# Patient Record
Sex: FEMALE | Race: WHITE | Employment: FULL TIME | URBAN - METROPOLITAN AREA
[De-identification: names, ages, dates, MRNs, and addresses within clinical notes are randomized per-mention and may not be internally consistent; named-entity substitution may affect disease eponyms.]

---

## 2018-08-08 ENCOUNTER — HOSPITAL ENCOUNTER (OUTPATIENT)
Dept: PHYSICAL THERAPY | Age: 23
Discharge: HOME | End: 2018-08-08
Payer: OTHER GOVERNMENT

## 2018-08-08 PROCEDURE — 97162 PT EVAL MOD COMPLEX 30 MIN: CPT

## 2018-08-08 PROCEDURE — 97530 THERAPEUTIC ACTIVITIES: CPT

## 2018-08-08 NOTE — PROGRESS NOTES
[facility]  [address] [facility]  [phone] fax    Plan of Care/ Statement of Necessity for Physical Therapy Services    Patient name: [facility] Start of Care: 2018   Referral source: Britany Jennings MD : 1995    Medical Diagnosis: Pelvic and perineal pain [R10.2]   Onset Date: chronic, about 10 years    Treatment Diagnosis: PFD, stress UI   Prior Hospitalization: see medical history Provider#: [id number]   Medications: Verified on Patient summary List    Comorbidities: , l/s compression fracture, Left hip avulsion fracture, depression, anxiety, PTSD, bordeline personality disorder, asthma   Prior Level of Function: less pain with all mobility, less leakage episodes         The Plan of Care and following information is based on the information from the initial evaluation. Assessment/ key information: Pt is a 21 y/o F who presents with c/o lower abdominal region and pelvic. Pt also reports ovary cysts, persistent menstruation for 7 weeks; pt will follow with specialist considering this. Pain/problem started since 14years old. Pt also had chronic back pain problem due to compression fracture since 15years old. Pt c/o stress UI symptoms since first child birth ( after attempt natural labor for 20 hours 2016). Pt can't recall number of leakage episode but reports increasing frequency. Nocturia 2x/night but mostly due to waking up to change her menstrual pads. Also, has bowel dysfunction including constipation and depends on stool softener; pt also has nausea; 1 bowel movement every couple days. Pt notices straining to have successful bowel movements. Pt reports max with internal exam/pap smear test; pt stated that she .  Pt reports pain which occur during sexual relations. Pain is rated 3/3 on Marinoff scale.  Evaluation reveals patient with musculoskeletal screen WNL with AROM of trunk, poor core and hips strength/endurance, TTP at lower abdominal, mod tightness of paraspinal muscles, slouching posture.  Internal assessment held due to active menses. Patient may benefit from physical therapy to address PFD, constipation and stress UI to improve QOL. Evaluation Complexity History HIGH Complexity :3+ comorbidities / personal factors will impact the outcome/ POC ; Examination MEDIUM Complexity : 3 Standardized tests and measures addressing body structure, function, activity limitation and / or participation in recreation  ;Presentation MEDIUM Complexity : Evolving with changing characteristics  ; Clinical Decision Making MEDIUM Complexity : FOTO score of 26-74  Overall Complexity Rating: MEDIUM    Problem List: Pelvic pain/dysfunction, Decreased pelvic floor mm awareness, Decreased pelvic floor mm strength, Use of accessory muscles, Improper voiding habits and Hypertonus of pelvic floor    Treatment Plan may include any combination of the following:   Therapeutic exercise, Neuromuscular re-education, Manual therapy, Physical agent/modality and Patient education  Patient / Family readiness to learn indicated by: asking questions, trying to perform skills and interest    Persons(s) to be included in education: patient (P)    Barriers to Learning/Limitations: yes;  emotional    Patient Goal (s): possible sex with no pain    Patient Self Reported Health Status: fair    Rehabilitation Potential: fair    Short Term Goals: To be accomplished in 4  weeks:  1. Patient will demonstrate home exercise program accurately as adjunct to PT clinic visits to promote healthy lifestyle and increase quality of life. Status @ Eval: initiated with abdominal massage    2. Patient will report ability to utilize Dilator progressively with no pain to promote decrease of symptoms and increase quality of life. Status @ Eval: provide as indicated      3.  Patient will report pain decreased to 2-3/3 on Marinoff scale for more normal function and increased quality of life. Status @ Eval: 3/3     4. Patient will have decreased pain to 5-6/10 at most with sexual relations and gynecological exams. Status @ Eval: 10/10 at worst     Long Term Goals: To be accomplished in 8  weeks:  1. Patient will have FOTO score for PFDI Pain decreased by 5-8% points indicating improvement in function and report of no difficulty with maintaining intimate relations due to pain. Status @ Eval: 79    2. Patient will be able to participate in sexual relations with no discomfort with 0-1/3 Marinoff score for increased quality of life and more normal function. Status @ Eval: 3/3     3. Patient will have decreased pain to 3/10 at most with sexual relations and gynecological exams. Status @ Eval: 10/10 at worst    4. Patient will report at least 50% improvement with stress UI to improve her QOL. Status @ Eval: couple leakage episodes within a month. 5. Patient will be independent with HEP at time of discharge to be able to continue with pelvic floor program.  Status @ Eval: initiated with abdominal massage     Frequency / Duration: Patient to be seen 2 times per week for 8 weeks. Patient/ Caregiver education and instruction: Diagnosis, prognosis, Proper Voiding Habits, Diet, Pain Management, Exercises and Bladder Retraining   [x]  Plan of care has been reviewed with DENITA Borjas, PT 8/8/2018 10:24 AM    ________________________________________________________________________    I certify that the above Therapy Services are being furnished while the patient is under my care. I agree with the treatment plan and certify that this therapy is necessary.     Physician's Signature:____________Date:_________TIME:________      ** Signature, Date and Time must be completed for valid certification **    Please sign and return to [facility] - [facility] [employer] Blas DIEGO  [address] [place] [address]  [phone] fax

## 2018-08-08 NOTE — PROGRESS NOTES
PF EVALUATION/TREATMENT NOTE     Patient Name: Lei Loomis  Date:2018  : 1995  [x]  Patient  Verified  Payor:  / Plan: Clayton Marie 73 / Product Type:  /    In time:9:45  Out time:10:20  Total Treatment Time (min): 35  Total Timed Codes (min): 20  1:1 Treatment Time ( [address] only): 35   Visit #: 1  16    Treatment Area: [x] Pelvic Floor     [] Other:    SUBJECTIVE  Any medication changes, allergies to medications, adverse drug reactions, diagnosis change, or new procedure performed?: [x] No    [] Yes (see summary sheet for update)    Pain Level : At Present: 7-8/10/10, At Best 8/10, At Worst 10/10   [] Constant []Intermittent    []Improving  []Staying the Same  []Getting worse    Current symptoms/Complaints: Pt is a 19 y/o F who presents with c/o lower abdominal region and pelvic. Pt also reports ovary cysts, persistent menstruation for 7 weeks; pt will follow with specialist considering this. Pain/problem started since 14years old. Pt also had chronic back pain problem due to compression fracture since 15years old. Pt c/o stress UI symptoms since first child birth ( after attempt natural labor for 20 hours ). Pt can't recall number of leakage episode but reports increasing frequency. Nocturia 2x/night but mostly due to waking up to change her menstrual pads. Also, has bowel dysfunction including constipation and depends on stool softener; pt also has nausea; 1 bowel movement every couple days. Pt notices straining to have successful bowel movements. Pt reports max with internal exam/pap smear test; pt stated that she . Pt reports pain which occur during sexual relations. Pain is rated 3/3 on Marinoff scale.       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  Living Situation:   Domestic Life: Activity/Recreational Limitations:     Pt Goals: \"possible sex with no pain\"  Barriers: comorbidities  []N/A []pain []financial []time []transportation []other  Motivation: fair  Substance use:  []N/A  []Alcohol []Tobacco []other:   FABQ Score: []low []elevate   Cognition: A & O x     Other:      OBJECTIVE     Pelvic Floor Dysfunction Evaluation    Musculoskeletal Screen:    Skin Integrity:  [] Healthy [] Red  [] Labia Atrophy [] Fragile    Sensation: [] Intact [] Diminished:    Muscle Bulk: [] Symmetrical  [] Well-developed [] Atrophied:  []L   []R   []B    Prolapse: [] Cystocele:   [] Rectocele:    PERF Score (Performance/Endurance/Repetitions/Flicks)   P:  E: R:  F:  Total:    Accessory Muscle Use:     Patient has failed previous pelvic floor muscle training? [] Yes    [] No    Lumbar Screen: WFL  ROM deficits:  Strength deficits: poor core and hips strength, motivation & fatigue issue? Breath Patterns:    Daily Water Intake: Bowel Habits:     15 min []Eval                  []Re-Eval        20 min Therapeutic Activity:  []  See flow sheet :    []  Increase Tissue extensibility        []  Assess fiber intake    []  Assess voiding habits  []  Assess bowel habits  [x]  Other: Pt edu within scope of practice on prognosis, POC, PFM anatomy/physiology, modalities use, modalities use, TENs unit use, abdominal massage, relaxation, meditation, sleep and overall health, hormonal balance. Rationale: increase ROM, increase strength, improve coordination, improve balance and increase proprioception  to improve the patients ability to tolerate ADLs, improve defecation dynamics. .          With   [] TE   [] TA   [] neuro  [] manual   [] other: Patient Education: [x] Review HEP    [] Progressed/Changed HEP based on:   [] positioning   [] body mechanics   [] transfers   [] heat/ice application    [] other:      Other Objective/Functional Measures:     Pain Level (0-10 scale) post treatment: 8/10    ASSESSMENT/Changes in Function: see POC    Patient will continue to benefit from skilled PT services to modify and progress therapeutic interventions, address functional mobility deficits, address ROM deficits, address strength deficits, analyze and address soft tissue restrictions, analyze and cue movement patterns, analyze and modify body mechanics/ergonomics, assess and modify postural abnormalities, address imbalance/dizziness and instruct in home and community integration to attain remaining goals.      [x]  See Plan of Care  []  See progress note/recertification  []  See Discharge Summary         Progress towards goals / Updated goals:  See POC    PLAN  [x]  Upgrade activities as tolerated     [x]  Continue plan of care  []  Update interventions per flow sheet       []  Discharge due to:_  []  Other:_    Ward Mckinney, PT 8/8/2018  9:47 AM

## 2018-08-22 ENCOUNTER — APPOINTMENT (OUTPATIENT)
Dept: PHYSICAL THERAPY | Age: 23
End: 2018-08-22
Payer: OTHER GOVERNMENT

## 2018-08-28 ENCOUNTER — HOSPITAL ENCOUNTER (OUTPATIENT)
Dept: LAB | Age: 23
Discharge: HOME | End: 2018-08-28
Payer: OTHER GOVERNMENT

## 2018-08-28 ENCOUNTER — OFFICE VISIT (OUTPATIENT)
Dept: ONCOLOGY | Age: 23
End: 2018-08-28

## 2018-08-28 ENCOUNTER — HOSPITAL ENCOUNTER (OUTPATIENT)
Dept: ONCOLOGY | Age: 23
Discharge: HOME | End: 2018-08-28

## 2018-08-28 VITALS
TEMPERATURE: 99 F | HEIGHT: 65 IN | SYSTOLIC BLOOD PRESSURE: 110 MMHG | DIASTOLIC BLOOD PRESSURE: 70 MMHG | RESPIRATION RATE: 18 BRPM | BODY MASS INDEX: 24.89 KG/M2 | WEIGHT: 149.4 LBS | HEART RATE: 94 BPM

## 2018-08-28 DIAGNOSIS — N93.8 DYSFUNCTIONAL UTERINE BLEEDING: ICD-10-CM

## 2018-08-28 DIAGNOSIS — N93.8 DYSFUNCTIONAL UTERINE BLEEDING: Primary | ICD-10-CM

## 2018-08-28 DIAGNOSIS — D50.0 CHRONIC BLOOD LOSS ANEMIA: ICD-10-CM

## 2018-08-28 LAB
ALBUMIN SERPL-MCNC: 3.5 G/DL (ref 3.4–5)
ALBUMIN/GLOB SERPL: 0.8 {RATIO} (ref 0.8–1.7)
ALP SERPL-CCNC: 52 U/L (ref 45–117)
ALT SERPL-CCNC: 20 U/L (ref 13–56)
ANION GAP SERPL CALC-SCNC: 4 MMOL/L (ref 3–18)
AST SERPL-CCNC: 16 U/L (ref 15–37)
BASO+EOS+MONOS # BLD AUTO: 0.5 K/UL (ref 0–2.3)
BASO+EOS+MONOS # BLD AUTO: 7 % (ref 0.1–17)
BILIRUB SERPL-MCNC: 0.3 MG/DL (ref 0.2–1)
BUN SERPL-MCNC: 7 MG/DL (ref 7–18)
BUN/CREAT SERPL: 11 (ref 12–20)
CALCIUM SERPL-MCNC: 8.7 MG/DL (ref 8.5–10.1)
CHLORIDE SERPL-SCNC: 108 MMOL/L (ref 100–108)
CO2 SERPL-SCNC: 28 MMOL/L (ref 21–32)
CREAT SERPL-MCNC: 0.63 MG/DL (ref 0.6–1.3)
DIFFERENTIAL METHOD BLD: NORMAL
ERYTHROCYTE [DISTWIDTH] IN BLOOD BY AUTOMATED COUNT: 13.1 % (ref 11.5–14.5)
FERRITIN SERPL-MCNC: 18 NG/ML (ref 8–388)
GLOBULIN SER CALC-MCNC: 4.4 G/DL (ref 2–4)
GLUCOSE SERPL-MCNC: 77 MG/DL (ref 74–99)
HCT VFR BLD AUTO: 38.6 % (ref 36–48)
HGB BLD-MCNC: 12.5 G/DL (ref 12–16)
IRON SATN MFR SERPL: 31 %
IRON SERPL-MCNC: 115 UG/DL (ref 50–175)
LYMPHOCYTES # BLD: 2.3 K/UL (ref 1.1–5.9)
LYMPHOCYTES NFR BLD: 30 % (ref 14–44)
MCH RBC QN AUTO: 28.7 PG (ref 25–35)
MCHC RBC AUTO-ENTMCNC: 32.4 G/DL (ref 31–37)
MCV RBC AUTO: 88.7 FL (ref 78–102)
NEUTS SEG # BLD: 4.9 K/UL (ref 1.8–9.5)
NEUTS SEG NFR BLD: 63 % (ref 40–70)
PLATELET # BLD AUTO: 256 K/UL (ref 140–440)
POTASSIUM SERPL-SCNC: 4 MMOL/L (ref 3.5–5.5)
PROT SERPL-MCNC: 7.9 G/DL (ref 6.4–8.2)
RBC # BLD AUTO: 4.35 M/UL (ref 4.1–5.1)
SODIUM SERPL-SCNC: 140 MMOL/L (ref 136–145)
TIBC SERPL-MCNC: 373 UG/DL (ref 250–450)
WBC # BLD AUTO: 7.7 K/UL (ref 4.5–13)

## 2018-08-28 PROCEDURE — 81241 F5 GENE: CPT | Performed by: INTERNAL MEDICINE

## 2018-08-28 PROCEDURE — 83540 ASSAY OF IRON: CPT | Performed by: INTERNAL MEDICINE

## 2018-08-28 PROCEDURE — 80053 COMPREHEN METABOLIC PANEL: CPT | Performed by: INTERNAL MEDICINE

## 2018-08-28 PROCEDURE — 82728 ASSAY OF FERRITIN: CPT | Performed by: INTERNAL MEDICINE

## 2018-08-28 RX ORDER — CHLORZOXAZONE 500 MG/1
TABLET ORAL
COMMUNITY
Start: 2018-08-23

## 2018-08-28 RX ORDER — OXYCODONE HYDROCHLORIDE 5 MG/1
TABLET ORAL
COMMUNITY
Start: 2018-07-13 | End: 2020-10-10

## 2018-08-28 RX ORDER — LIDOCAINE 50 MG/G
PATCH TOPICAL
COMMUNITY
Start: 2018-07-24

## 2018-08-28 RX ORDER — IBUPROFEN 800 MG/1
TABLET ORAL
COMMUNITY
Start: 2018-07-13

## 2018-08-28 RX ORDER — LITHIUM CARBONATE 300 MG/1
CAPSULE ORAL
COMMUNITY
Start: 2018-08-09

## 2018-08-28 RX ORDER — DIVALPROEX SODIUM 500 MG/1
TABLET, DELAYED RELEASE ORAL
COMMUNITY
Start: 2018-07-26

## 2018-08-28 RX ORDER — METRONIDAZOLE 500 MG/1
TABLET ORAL
COMMUNITY
Start: 2018-07-17 | End: 2020-10-10

## 2018-08-28 RX ORDER — DOXYCYCLINE 100 MG/1
CAPSULE ORAL
COMMUNITY
Start: 2018-07-17 | End: 2020-10-10

## 2018-08-28 RX ORDER — LEVONORGESTREL / ETHINYL ESTRADIOL 0.15-0.03
KIT ORAL
COMMUNITY
Start: 2018-07-12 | End: 2020-10-10

## 2018-08-28 RX ORDER — CLONAZEPAM 0.5 MG/1
TABLET ORAL
COMMUNITY
Start: 2018-08-01 | End: 2020-10-10

## 2018-08-28 RX ORDER — PROMETHAZINE HYDROCHLORIDE 25 MG/1
TABLET ORAL
COMMUNITY
Start: 2018-08-27 | End: 2020-10-10

## 2018-08-28 RX ORDER — PRAZOSIN HYDROCHLORIDE 1 MG/1
CAPSULE ORAL
COMMUNITY
Start: 2018-08-09

## 2018-08-28 RX ORDER — DIVALPROEX SODIUM 500 MG/1
TABLET, EXTENDED RELEASE ORAL
COMMUNITY
Start: 2018-08-09 | End: 2018-08-28 | Stop reason: SDUPTHER

## 2018-08-28 RX ORDER — ACETAMINOPHEN 500 MG/1
TABLET, FILM COATED ORAL
COMMUNITY
Start: 2018-07-10

## 2018-08-28 RX ORDER — SCOPALAMINE 1 MG/3D
PATCH, EXTENDED RELEASE TRANSDERMAL
COMMUNITY
Start: 2018-07-24

## 2018-08-28 RX ORDER — TRAMADOL HYDROCHLORIDE 50 MG/1
TABLET ORAL
COMMUNITY
Start: 2018-08-23 | End: 2020-10-10

## 2018-08-28 RX ORDER — ACETAMINOPHEN 100 MG/ML
DROPS ORAL
COMMUNITY
Start: 2018-07-13

## 2018-08-28 RX ORDER — PANTOPRAZOLE SODIUM 40 MG/1
TABLET, DELAYED RELEASE ORAL
COMMUNITY
Start: 2018-08-27 | End: 2020-10-10

## 2018-08-28 RX ORDER — ARIPIPRAZOLE 5 MG/1
TABLET ORAL
COMMUNITY
Start: 2018-08-09 | End: 2020-10-10

## 2018-08-28 RX ORDER — NAPROXEN 500 MG/1
TABLET ORAL
COMMUNITY
Start: 2018-07-10 | End: 2020-10-10

## 2018-08-28 RX ORDER — ONDANSETRON 4 MG/1
TABLET, ORALLY DISINTEGRATING ORAL
COMMUNITY
Start: 2018-07-24 | End: 2020-10-10

## 2018-08-28 RX ORDER — TRAMADOL HYDROCHLORIDE 100 MG/1
TABLET, EXTENDED RELEASE ORAL
COMMUNITY
Start: 2018-07-17 | End: 2020-10-10

## 2018-08-28 RX ORDER — ACETAMINOPHEN 325 MG/1
TABLET ORAL
COMMUNITY
Start: 2018-07-13

## 2018-08-28 NOTE — MR AVS SNAPSHOT
[address] [facility] [address] [facility] [id number] [phone] Patient: Yani Daly MRN: [id number] :1995 Visit Information Date & Time Provider Department Dept. Phone Encounter #  
 2018  1:30 PM Re Ceron MD [facility]  [phone] [id number] Follow-up Instructions Return in about 2 weeks (around 2018). Your Appointments 2018  9:45 AM  
Office Visit with MD Tara [facility]  [address] [facility]) [place] [address] [address] [facility] [address] [phone]  
  
   
 [address] Upcoming Health Maintenance Date Due  
 HPV Age 9Y-34Y (1 of 1 - Female 3 Dose Series) 2005 DTaP/Tdap/Td series (1 - Tdap) 2015 PAP AKA CERVICAL CYTOLOGY 2015 Influenza Age 5 to Adult 2018 Allergies as of 2018  Review Complete On: 2018 By: Re Ceron MD  
 No Known Allergies Current Immunizations  Never Reviewed No immunizations on file. Not reviewed this visit You Were Diagnosed With   
  
 Codes Comments Dysfunctional uterine bleeding    -  Primary ICD-10-CM: N93.8 ICD-9-CM: 626.8 Chronic blood loss anemia     ICD-10-CM: D50.0 ICD-9-CM: 280.0 Vitals BP Pulse Temp Resp Height(growth percentile) Weight(growth percentile) 110/70 (BP 1 Location: Left arm, BP Patient Position: Sitting) 94 99 °F (37.2 °C) (Oral) 18 5' 5\" (1.651 m) 149 lb 6.4 oz (67.8 kg) BMI Smoking Status 24.86 kg/m2 Never Smoker BMI and BSA Data Body Mass Index Body Surface Area  
 24.86 kg/m 2 1.76 m 2 Your Updated Medication List  
  
   
This list is accurate as of 18  2:58 PM.  Always use your most recent med list.  
  
  
  
  
 * TYLENOL EXTRA STRENGTH 500 mg tablet Generic drug:  acetaminophen * acetaminophen 325 mg tablet Commonly known as:  TYLENOL  
  
 ARIPiprazole 5 mg tablet Commonly known as:  ABILIFY  
  
 chlorzoxazone 500 mg tablet Commonly known as:  PARAFON FORTE  
  
 clonazePAM 0.5 mg tablet Commonly known as:  KlonoPIN  
  
 DEPAKOTE 500 mg tablet Generic drug:  divalproex DR  
  
 doxycycline 100 mg capsule Commonly known as:  VIBRAMYCIN  
  
 ibuprofen 800 mg tablet Commonly known as:  MOTRIN  
  
 JOLESSA 0.15 mg-30 mcg 3mpk Generic drug:  levonorgestrel-ethinyl estradiol  
  
 lidocaine 5 % Commonly known as:  LIDODERM  
  
 lithium carbonate 300 mg capsule  
  
 metroNIDAZOLE 500 mg tablet Commonly known as:  FLAGYL  
  
 naproxen 500 mg tablet Commonly known as:  NAPROSYN  
  
 ondansetron 4 mg disintegrating tablet Commonly known as:  ZOFRAN ODT  
  
 oxyCODONE IR 5 mg immediate release tablet Commonly known as:  ROXICODONE  
  
 pantoprazole 40 mg tablet Commonly known as:  PROTONIX  
  
 prazosin 1 mg capsule Commonly known as:  MINIPRESS  
  
 promethazine 25 mg tablet Commonly known as:  PHENERGAN  
  
 STOOL SOFTENER (DOCUSATE RADHA) 240 mg capsule Generic drug:  docusate calcium * traMADol 100 mg Tb24 Commonly known as:  ULTRAM-ER  
  
 * traMADol 50 mg tablet Commonly known as:  ULTRAM  
  
 TRANSDERM-SCOP 1 mg over 3 days Pt3d Generic drug:  scopolamine * Notice: This list has 4 medication(s) that are the same as other medications prescribed for you. Read the directions carefully, and ask your doctor or other care provider to review them with you. Follow-up Instructions Return in about 2 weeks (around 9/11/2018). To-Do List   
 08/28/2018 Lab:  FACTOR V LEIDEN   
  
 08/28/2018 Lab:  FERRITIN   
  
 08/28/2018 Lab:  IRON PROFILE   
  
 08/28/2018 Lab:  METABOLIC PANEL, COMPREHENSIVE   
  
 08/28/2018 Lab: PROTHROMBIN TIME + INR   
  
 08/28/2018 Lab:  PTT   
  
 08/29/2018 Lab:  FACTOR IX ACTIVITY   
  
 08/29/2018 Lab:  FACTOR VII ACTIVITY   
  
 08/29/2018 Lab:  FACTOR VIII ACTIVITY   
  
 08/29/2018 Lab:  FACTOR X ACTIVITY   
  
 08/29/2018 Lab:  FACTOR XIII   
  
 08/29/2018 Lab:  [facility] ANTIGEN   
  
 08/29/2018 Lab:  [facility] PANEL   
  
 08/29/2018 12:15 PM  
  Appointment with Jacinda Ask at [facility] [address] ([phone])  
  
 09/05/2018 11:15 AM  
  Appointment with Jacinda Ask at [facility] [address] ([phone])  
  
 09/12/2018 11:15 AM  
  Appointment with Jacinda Ask at [facility] [address] ([phone])  
  
 09/19/2018 10:15 AM  
  Appointment with Jacinda Ask at [facility] [address] ([phone])  
  
 09/26/2018 11:00 AM  
  Appointment with Jacinda Ask at [address] [place] [address] ([phone]) Introducing [facility]! [employer] introduces [employer] patient portal. Now you can access parts of your medical record, email your doctor's office, and request medication refills online. 1. In your internet browser, go to [email or web address] 2. Click on the First Time User? Click Here link in the Sign In box. You will see the New Member Sign Up page. 3. Enter your [employer] Access Code exactly as it appears below. You will not need to use this code after youve completed the sign-up process. If you do not sign up before the expiration date, you must request a new code. · [employer] Access Code: [email or web address] Expires: 10/28/2018 12:53 PM 
 
4. Enter the last four digits of your Social Security Number (xxxx) and Date of Birth (mm/dd/yyyy) as indicated and click Submit. You will be taken to the next sign-up page. 5. Create a MyChart ID. This will be your MyChart login ID and cannot be changed, so think of one that is secure and easy to remember. 6. Create a [employer] password. You can change your password at any time. 7. Enter your Password Reset Question and Answer. This can be used at a later time if you forget your password. 8. Enter your e-mail address. You will receive e-mail notification when new information is available in [address] 9. Click Sign Up. You can now view and download portions of your medical record. 10. Click the Download Summary menu link to download a portable copy of your medical information. If you have questions, please visit the Frequently Asked Questions section of the [employer] website. Remember, [employer] is NOT to be used for urgent needs. For medical emergencies, dial 911. Now available from your iPhone and Android! Please provide this summary of care documentation to your next provider. Your primary care clinician is listed as NONE. If you have any questions after today's visit, please call [phone].

## 2018-08-28 NOTE — PROGRESS NOTES
Hematology/Oncology Consultation Note Name: Tai Robles Date: 2018 : 1995 None Ms. Kristine Falcon  is a 20 y.o. woman with a history of endometriosis. She has been experiencing excessive uterine bleeding. She is referred here for evaluation at that time whether or not she has a coagulation abnormality that is contributing to her blood loss. Subjective: Chief complaint: Excessive uterine bleeding/imaging History of present illness: Ms. Kristine Falcon is a 69-year-old woman with a long-standing history of endometriosis. She continues to have excessive uterine bleeding and the decision was made to send her here to see if there is any underlying coagulation protein abnormality contributing to her excessive bleeding. The most recent CBC did show that her globin has remained relatively normal at 12.1 g/dL with hematocrit of 35.2%. This blood test was done 2018. Her platelets were also normal at 262,000. Today the patient has no additional complaints or concerns to report. Past Medical History:  
Diagnosis Date  Anxiety  Appetite loss  Asthma  Back pain  Borderline personality disorder  Depression  Falling hair  Muscle pain  PTSD (post-traumatic stress disorder)  Trouble in sleeping No Known Allergies Past Surgical History:  
Procedure Laterality Date  DELIVERY  Social History Social History  Marital status: UNKNOWN Spouse name: N/A  
 Number of children: N/A  
 Years of education: N/A Occupational History  Not on file. Social History Main Topics  Smoking status: Never Smoker  Smokeless tobacco: Never Used  Alcohol use No  
   Comment: quit 2016  Drug use: No  
 Sexual activity: Yes  
  Partners: Male Other Topics Concern  Not on file Social History Narrative  No narrative on file Family History Problem Relation Age of Onset  Diabetes Maternal Grandmother  Diabetes Maternal Grandfather  Stroke Maternal Grandfather  Hypertension Maternal Grandfather  Diabetes Paternal Grandmother  Diabetes Paternal Grandfather Current Outpatient Prescriptions Medication Sig Dispense Refill  ARIPiprazole (ABILIFY) 5 mg tablet  chlorzoxazone (PARAFON FORTE) 500 mg tablet  clonazePAM (KLONOPIN) 0.5 mg tablet  DEPAKOTE 500 mg tablet  doxycycline (VIBRAMYCIN) 100 mg capsule  JOLESSA 0.15 mg-30 mcg 3MPk  lithium carbonate 300 mg capsule  ondansetron (ZOFRAN ODT) 4 mg disintegrating tablet  prazosin (MINIPRESS) 1 mg capsule  promethazine (PHENERGAN) 25 mg tablet  traMADol (ULTRAM) 50 mg tablet  traMADol (ULTRAM-ER) 100 mg Tb24     
 acetaminophen (TYLENOL) 325 mg tablet  TYLENOL EXTRA STRENGTH 500 mg tablet  STOOL SOFTENER, DOCUSATE RADHA, 240 mg capsule  ibuprofen (MOTRIN) 800 mg tablet  lidocaine (LIDODERM) 5 %  metroNIDAZOLE (FLAGYL) 500 mg tablet  naproxen (NAPROSYN) 500 mg tablet  oxyCODONE IR (ROXICODONE) 5 mg immediate release tablet  pantoprazole (PROTONIX) 40 mg tablet  TRANSDERM-SCOP 1 mg over 3 days pt3d Review of Systems General ROS:The patient has no complaints and there is no physical distress evident. Psychological ROS: patient denies having any psychological symptoms such as hallucinations, depression or anxiety. Ophthalmic ROS:the patient denies having any visual impairment or eye discomfort. ENT ROS: there are no abnormalities reported. Allergy and Immunology ROS:the patient denies having any seasonal allergies or allergies to medications other than those already outlined above. Hematological and Lymphatic ROS: the patient denies having any bruising, bleeding or lymphadenopathy. Endocrine ROS: the patient denies having any heat or cold intolerance. There is no history of diabetes or thyroid disorders. Breast ROS: the patient denies having any history of breast mass, nipple discharge, or lumps. Respiratory ROS:the patient denies having any cough, shortness of breath, or dyspnea on exertion. Cardiovascular ROS: there are no complaints of chest pain, palpitations, chest pounding, or dyspnea on exertion. Gastrointestinal ROS: the patient denies having nausea, emesis, diarrhea, constipation, or blood in the stool. Genito-Urinary ROS: the patient denies having urinary urgency, frequency, or dysuria. Musculoskeletal ROS: with the exception of mild arthralgias the patient has no other musculoskeletal complaints. Neurological ROS: the patient denies having any numbness, tingling, or neurologic deficits. Dermatological ROS:patient denies having any unexplained rash, skin ulcerations, or hives. Objective:  
 
Visit Vitals  /70 (BP 1 Location: Left arm, BP Patient Position: Sitting)  Pulse 94  Temp 99 °F (37.2 °C) (Oral)  Resp 18  Ht 5' 5\" (1.651 m)  Wt 67.8 kg (149 lb 6.4 oz)  BMI 24.86 kg/m2 ECOGPS=0; pain score=0/10 Physical Exam:  
Gen. Appearance: the patient is in no acute distress. Skin: There is no evidence of bruise or rash. HEENT: The head is normocephalic and atraumatic. The conjunctiva and sclera are clear. Pupils are equal, round, reactive to light, and accommodation. The extraocular movements are intact. ENT reveals no oral mucosal lesions or ulcerations. Neck: Supple without lymphadenopathy or thyromegaly. Lungs: Clear to auscultation and percussion; there are no wheezes or rhonchi. Heart: Regular rate and rhythm; there are no murmurs, gallops, or rubs. Abdomen: Bowel sounds are present and normal.  There is no guarding, tenderness, or hepatosplenomegaly. Extremities: There is no clubbing, cyanosis, or edema. Neurologic: There are no focal neurologic deficits. Lymphatics:  There is no palpable peripheral lymphadenopathy. Lab data: 
Lab data from 7/15/2018 over urinalysis show no evidence of urinary tract infection. CBC dated 7/14/2018 showed WBC count 12.1, hemoglobin 12.1 g/dL, hematocrit 35.2%, and the platelet count was 164,276 The comprehensive metabolic panel dated 6/14/3442 showed glucose 72, BUN 4, creatinine 0.65, sodium 138, potassium 3.6, chloride 103, CO2 24, calcium 8.7, protein 7.7, albumin 3.7, alkaline phosphatase 84, ALT 32, AST 33, total bilirubin 0.3, and the hemolysis index was less than 15. Assessment:  
Dysfunctional uterine bleeding/uterine hemorrhage: I have explained to the patient that based on the history that she gives and the lab data that we have seen she is not experiencing a significant amount of blood loss since her hemoglobin has remained above 12 g/dL. Nonetheless we will investigate whether not there is coagulation abnormality versus a purely anatomical problem. Plan:  
Dysfunctional uterine bleeding/uterine hemorrhaging: The comprehensive metabolic panel, factor V activity, factor VII activity, factor VIII activity, factor IX activity, factor X activity, and factor XI activity will be requested. Additionally a von Willebrand factor assay will also be requested. The PT, PTT, and INR will be ordered as well. I will see the patient back in clinic in 2 weeks to review lab data to see if there is a potential coagulation problem contributing to her complaints of dysfunctional uterine bleeding. Follow-up in 2 weeks Orders Placed This Encounter  METABOLIC PANEL, COMPREHENSIVE Standing Status:   Future Standing Expiration Date:   8/29/2019  
 IRON PROFILE Standing Status:   Future Standing Expiration Date:   8/29/2019  FERRITIN Standing Status:   Future Standing Expiration Date:   8/29/2019 [address] Standing Status:   Future Standing Expiration Date:   8/29/2019  VON WILLEBRAND PANEL Standing Status:   Future Standing Expiration Date:   8/29/2019  FACTOR XIII Standing Status:   Future Standing Expiration Date:   8/29/2019  FACTOR X ACTIVITY Standing Status:   Future Standing Expiration Date:   8/29/2019  FACTOR VIII ACTIVITY Standing Status:   Future Standing Expiration Date:   8/29/2019  FACTOR VII ACTIVITY Standing Status:   Future Standing Expiration Date:   8/29/2019  FACTOR IX ACTIVITY Standing Status:   Future Standing Expiration Date:   8/29/2019  FACTOR V LEIDEN Standing Status:   Future Standing Expiration Date:   8/29/2019  PTT Standing Status:   Future Standing Expiration Date:   8/29/2019  
 PROTHROMBIN TIME + INR Standing Status:   Future Standing Expiration Date:   8/29/2019  acetaminophen (TYLENOL) 325 mg tablet  TYLENOL EXTRA STRENGTH 500 mg tablet  ARIPiprazole (ABILIFY) 5 mg tablet  chlorzoxazone (PARAFON FORTE) 500 mg tablet  clonazePAM (KLONOPIN) 0.5 mg tablet  DISCONTD: DEPAKOTE  mg ER tablet  DEPAKOTE 500 mg tablet  STOOL SOFTENER, DOCUSATE RADHA, 240 mg capsule  doxycycline (VIBRAMYCIN) 100 mg capsule  ibuprofen (MOTRIN) 800 mg tablet  JOLESSA 0.15 mg-30 mcg 3MPk  lidocaine (LIDODERM) 5 %  lithium carbonate 300 mg capsule  metroNIDAZOLE (FLAGYL) 500 mg tablet  naproxen (NAPROSYN) 500 mg tablet  ondansetron (ZOFRAN ODT) 4 mg disintegrating tablet  oxyCODONE IR (ROXICODONE) 5 mg immediate release tablet  pantoprazole (PROTONIX) 40 mg tablet  prazosin (MINIPRESS) 1 mg capsule  promethazine (PHENERGAN) 25 mg tablet  TRANSDERM-SCOP 1 mg over 3 days pt3d  
 traMADol (ULTRAM) 50 mg tablet  traMADol (ULTRAM-ER) 100 mg Tb24 Megan Weeks MD 
8/28/2018 Please note:  This document has been produced using voice recognition software. Unrecognized errors in transcription may be present.

## 2018-08-29 ENCOUNTER — HOSPITAL ENCOUNTER (OUTPATIENT)
Dept: PHYSICAL THERAPY | Age: 23
Discharge: HOME | End: 2018-08-29
Payer: OTHER GOVERNMENT

## 2018-08-29 PROCEDURE — 97530 THERAPEUTIC ACTIVITIES: CPT

## 2018-08-29 NOTE — PROGRESS NOTES
PF DAILY TREATMENT NOTE 316    Patient Name: Mandie Rosas  Date:2018  : 1995  [x]  Patient  Verified  Payor:  / Plan: Clayton Marie 73 / Product Type:  /    In time: 11:47  Out time:12:43  Total Treatment Time (min): 56  Total Timed Codes (min): 56  1:1 Treatment Time ( [address] only): 64   Visit #: 2 of 16    Treatment Area: [x] Pelvic Floor     [] Other:    SUBJECTIVE  Pain Level (0-10 scale): 610  Any medication changes, allergies to medications, adverse drug reactions, diagnosis change, or new procedure performed?: [x] No    [] Yes (see summary sheet for update)  Subjective functional status/changes:   [] No changes reported  Pt reported doing ok so far    OBJECTIVE    Modality rationale: decrease pain and increase tissue extensibility to improve the patients ability to tolerate ADLs   Min Type Additional Details   10 during TA [x] Estim:  []Unatt       [x]IFC  []Premod                        []Other:  []w/ice   []w/heat  Position: supine  Location: pelvic region    [] Estim: []Att    []TENS instruct  []NMES                    []Other:  []w/US   []w/ice   []w/heat  Position:  Location:    []  Ultrasound: []Continuous   [] Pulsed                           []1MHz   []3MHz Position:  Location:    []  Ice     []  heat  []  Ice massage  []  Laser   []  Anodyne Position:  Location:   [x] Skin assessment post-treatment:  [x]intact []redness- no adverse reaction    []redness - adverse reaction:         46 min Therapeutic Activity:  []  See flow sheet :    [x]  Increase Tissue extensibility        [x]  Assess fiber intake    [x]  Assess voiding habits  [x]  Assess bowel habits  [x]  Other: constipation management with constipation package, optimal voiding, finding balance; education on TENs unit use, posture, diaphragmatic breathing; dilator use, HEP for posture.    Rationale: increase ROM, increase strength, improve coordination, improve balance and increase proprioception  to improve the patients ability to improve defecation dynamics and tolerance to ADLs          With   [] TE   [] TA   [] neuro  [] manual   [] other: Patient Education: [x] Review HEP    [] Progressed/Changed HEP based on:   [] positioning   [] body mechanics   [] transfers   [] heat/ice application    [] other:      Other Objective/Functional Measures:   []baseline resting tone:   []slow twitch mms   []fast twitch mms   Min improved posture after education   Pelvic Floor Dysfunction Evaluation     Musculoskeletal Screen:     Skin Integrity:            [x] Healthy                     [] Red                                     [] Labia Atrophy           [] Fragile     Sensation:                 [x] Intact            [] Diminished:     Muscle Bulk:              [x] Symmetrical  [] Well-developed                     [] Atrophied:  []L   []R   []B     Prolapse:                   [] Cystocele:                                                            [] Rectocele:     PERF Score (Performance/Endurance/Repetitions/Flicks)                        P: 1  E: R:  F:  Total:     Accessory Muscle Use:      Patient has failed previous pelvic floor muscle training? [] Yes    [] No    Pain Level (0-10 scale) post treatment: 5/10    ASSESSMENT/Changes in Function: pt present with hypertonicity, poor awareness and poor relaxation of PFM (bearing down during request for PFM contraction as stopping gas/urine). She demonstrated mod improvement with coordination after edu but cont to demonstrate max difficulty with relaxation, PFM spasm noted. Will progress with stretching and PFM relaxation technique next visit.      []  Decrease # of leaks   [] No change []  Improving [] Resolved     []  Decrease hypertonus [] No change []  Improving [] Resolved     []  Increase void interval [] No change []  Improving [] Resolved     []  Increase PF strength [] No change []  Improving [] Resolved     []  Increase PF endurance [] No change []  Improving [] Resolved     []  Increase endurance [] No change []  Improving [] Resolved     []  Decrease # of pads [] No change []  Improving [] Resolved     []  Decrease pain [] No change []  Improving [] Resolved     []  Increased coordination [] No change []  Improving [] Resolved     []  Increased Bowel Frequency [] No change []  Improving [] Resolved       Patient will continue to benefit from skilled PT services to modify and progress therapeutic interventions, address functional mobility deficits, address ROM deficits, address strength deficits, analyze and address soft tissue restrictions, analyze and cue movement patterns, analyze and modify body mechanics/ergonomics, assess and modify postural abnormalities and instruct in home and community integration to attain remaining goals. [x]  See Plan of Care  []  See progress note/recertification  []  See Discharge Summary         Progress towards goals / Updated goals:  Short Term Goals: To be accomplished in 4  weeks:  1. Patient will demonstrate home exercise program accurately as adjunct to PT clinic visits to promote healthy lifestyle and increase quality of life. Status @ Eval: initiated with abdominal massage  Current: added HEP for posture and initiated constipation management     2. Patient will report ability to utilize Dilator progressively with no pain to promote decrease of symptoms and increase quality of life. Status @ Eval: provide as indicated    Current: issue S+ sized dilator 8-29-18     3. Patient will report pain decreased to 2-3/3 on Marinoff scale for more normal function and increased quality of life. Status @ Eval: 3/3      4. Patient will have decreased pain to 5-6/10 at most with sexual relations and gynecological exams. Status @ Eval: 10/10 at [facility] be accomplished in [facility]:  1.  Patient will have FOTO score for PFDI Pain decreased by 5-8% points indicating improvement in function and report of no difficulty with maintaining intimate relations due to pain. Status @ Eval: 79     2. Patient will be able to participate in sexual relations with no discomfort with 0-1/3 Marinoff score for increased quality of life and more normal function. Status @ Eval: 3/3      3. Patient will have decreased pain to 3/10 at most with sexual relations and gynecological exams. Status @ Eval: 10/10 at worst     4. Patient will report at least 50% improvement with stress UI to improve her QOL. Status @ Eval: couple leakage episodes within a month.     5.  Patient will be independent with HEP at time of discharge to be able to continue with pelvic floor program.  Status @ Eval: initiated with abdominal massage     PLAN  [x]  Upgrade activities as tolerated     [x]  Continue plan of care  []  Update interventions per flow sheet       []  Discharge due to:_  []  Other:_      Agnieszka Adler, PT 8/29/2018  7:52 AM    Future Appointments  Date Time Provider Huyen Morris   8/29/2018 12:15 PM Thienphuc Juneau Amis [facility]   9/5/2018 11:15 AM Thienphuc Juneau Amis [facility]   9/12/2018 11:15 AM Thienphuc Juneau Amis [facility]   9/18/2018 9:45 AM Wes Fink MD [id number]   9/19/2018 10:15 AM Thienphuc Juneau Amis [facility]   9/26/2018 11:00 AM Thienphuc Juneau Amis [facility]

## 2018-08-31 ENCOUNTER — HOSPITAL ENCOUNTER (OUTPATIENT)
Dept: LAB | Age: 23
Discharge: HOME | End: 2018-08-31
Payer: OTHER GOVERNMENT

## 2018-08-31 DIAGNOSIS — D50.0 CHRONIC BLOOD LOSS ANEMIA: ICD-10-CM

## 2018-08-31 DIAGNOSIS — N93.8 DYSFUNCTIONAL UTERINE BLEEDING: ICD-10-CM

## 2018-08-31 LAB
APTT PPP: 36.1 SEC (ref 23–36.4)
F5 GENE MUT ANL BLD/T: NORMAL
INR PPP: 1 (ref 0.8–1.2)
PROTHROMBIN TIME: 13.1 SEC (ref 11.5–15.2)

## 2018-08-31 PROCEDURE — 85730 THROMBOPLASTIN TIME PARTIAL: CPT | Performed by: INTERNAL MEDICINE

## 2018-08-31 PROCEDURE — 85610 PROTHROMBIN TIME: CPT | Performed by: INTERNAL MEDICINE

## 2018-09-05 ENCOUNTER — HOSPITAL ENCOUNTER (OUTPATIENT)
Dept: PHYSICAL THERAPY | Age: 23
Discharge: HOME | End: 2018-09-05
Payer: OTHER GOVERNMENT

## 2018-09-05 PROCEDURE — 97530 THERAPEUTIC ACTIVITIES: CPT

## 2018-09-05 PROCEDURE — 97140 MANUAL THERAPY 1/> REGIONS: CPT

## 2018-09-05 NOTE — PROGRESS NOTES
[facility] - [address]  [phone] fax    Pelvic Floor Progress Note  Patient name: [facility] Start of Care: 2018   Referral source: Britany Jennings MD : 1995                         Medical Diagnosis: Pelvic and perineal pain [R10.2] Onset Date: chronic, about 10 years                         Treatment Diagnosis: PFD, stress UI   Prior Hospitalization: see medical history Provider#: [id number]   Medications: Verified on Patient summary List    Comorbidities: , l/s compression fracture, Left hip avulsion fracture, depression, anxiety, PTSD, bordeline personality disorder, asthma   Prior Level of Function: less pain with all mobility, less leakage episodes    Visits from Start of Care: 3    Missed Visits: 0    Established Goals:           Excellent Good         Limited           None  [] Increase Pelvic MM strength []  []  []  []  [x] Decrease Pelvic MM hypertonus []  []  [x]  []  [x] Decrease Incontinence Episodes []  []  [x]  []   [x] Improve Voiding Habits  []  []  [x]  []  [] Decreased Urgency   []  []  []  []    Key Functional Changes: some improvement with pelvic floor pain    Updated Goals: to be achieved in 6 weeks:    1. Patient will report ability to utilize Dilator progressively with no pain to promote decrease of symptoms and increase quality of life. Status @ Eval: provide as indicated    Current: issue S+ sized dilator 18        2. Patient will have FOTO score for PFDI Pain decreased by 5-8% points indicating improvement in function and report of no difficulty with maintaining intimate relations due to pain. Status @ Eval: 79  Current: will be assessed at 5th visit 18      3. Patient will be able to participate in sexual relations with no discomfort with 0-1/3 Marinoff score for increased quality of life and more normal function.    Status @ Eval: 3/3   Current: no change 9-5-18      4. Patient will have decreased pain to 3/10 at most with sexual relations and gynecological exams. Status @ Eval: 10/10 at worst  Current: no change 9-5-18      5. Patient will report at least 50% improvement with stress UI to improve her QOL. Status @ Eval: couple leakage episodes within a month. Current: no change 9-5-18      6. Patient will be independent with HEP at time of discharge to be able to continue with pelvic floor program.  Status @ Eval: initiated with abdominal massage   Current: good compliance with HEP and fiber intake 9-5-18    ASSESSMENT/RECOMMENDATIONS: Pt making limited progress, partially due to few visits. Pt reported some improvement with pelvic floor pain and constipation management since [facility]. Pt demonstrated significant hypertonicity,  poor awareness, and strength of PFM. Also demonstrated poor core and hips strength. Min improvement kyphotic posture after education. Pt also present with mod LBP/SI joint pain during the last 2 visits; min Left/Left sacral obliquity noted. Pt would cont to benefit from skilled PT to address PFD, constipation, urinary incontinence and LBP/SI joint pain to improve her QOL.     [x]Continue therapy per initial plan/protocol at a frequency of  2 x per week for 6 weeks  []Continue therapy with the following recommended changes:_____________________      _____________________________________________________________________  []Discontinue therapy progressing towards or have reached established goals  []Discontinue therapy due to lack of appreciable progress towards goals  []Discontinue therapy due to lack of attendance or compliance  []Await Physician's recommendations/decisions regarding therapy  []Other:________________________________________________________________    Thank you for this referral.   Ole Boyd, PT 9/5/2018 12:11 PM  NOTE TO PHYSICIAN:  Charleen Arredondo 172   [facility] [phone]  If you are unable to process this request in 24 hours please contact our office: [phone]    ? I have read the above report and request that my patient continue as recommended. ? I have read the above report and request that my patient continue therapy with the following changes/special instructions:___________________________________________________________  ? I have read the above report and request that my patient be discharged from therapy.     [de-identified] Signature:____________Date:_________TIME:________    [employer], Date and Time must be completed for valid certification **

## 2018-09-05 NOTE — PROGRESS NOTES
PF DAILY TREATMENT NOTE 3-16    Patient Name: Mohsen Garcia  Date:2018  : 1995  [x]  Patient  Verified  Payor:  / Plan: Clayton Marie 73 / Product Type:  /    In time:11:21  Out time:12:00  Total Treatment Time (min): 39  Visit #: 3 of 16    Treatment Area: [x] Pelvic Floor     [] Other:    SUBJECTIVE  Pain Level (0-10 scale): 5/10  Any medication changes, allergies to medications, adverse drug reactions, diagnosis change, or new procedure performed?: [x] No    [] Yes (see summary sheet for update)  Subjective functional status/changes:   [] No changes reported  Pt reported pain mostly at lower back/SI joint; pt will have injection for SI joint on 9-, planned to have MRI for l/s and SI joint with 2 weeks. Pt had significant pain with pelvic region yeserday.     OBJECTIVE  Modality rationale: decrease pain and increase tissue extensibility to improve the patients ability to tolerate ADLs   Min Type Additional Details   10 during TA [x] Estim:  []Unatt       [x]IFC  []Premod                        []Other:  []w/ice   []w/heat  Position: supine  Location: pelvic region     [] Estim: []Att    []TENS instruct  []NMES                    []Other:  []w/US   []w/ice   []w/heat  Position:  Location:     []  Ultrasound: []Continuous   [] Pulsed                           []1MHz   []3MHz Position:  Location:     []  Ice     []  heat  []  Ice massage  []  Laser   []  Anodyne Position:  Location:   [x] Skin assessment post-treatment:  [x]intact []redness- no adverse reaction    []redness - adverse reaction:            16 min Therapeutic Activity:  []  See flow sheet :    [x]  Increase Tissue extensibility        [x]  Assess fiber intake    [x]  Assess voiding habits                                          [x]  Assess bowel habits  [x]  Other: healthy bladder, appropriate urination mechanics; education on TENs unit use for LBP/SI joint pain  Rationale: increase ROM, increase strength, improve coordination, improve balance and increase proprioception  to improve the patients ability to improve defecation dynamics and tolerance to ADLs          23 min Manual: manual stretching for hips, MET (ext & flex components) for lumbo-sacral pain       With   [] TE   [] TA   [] neuro  [] manual   [] other: Patient Education: [x] Review HEP    [] Progressed/Changed HEP based on:   [] positioning   [] body mechanics   [] transfers   [] heat/ice application    [] other:      Other Objective/Functional Measures:   []baseline resting tone:   []slow twitch mms   []fast twitch mms    Pain Level (0-10 scale) post treatment: 2.5/10    ASSESSMENT/Changes in Function: See progress note/recertification     []  Decrease # of leaks [] No change []  Improving [] Resolved   []  Decrease hypertonus [] No change []  Improving [] Resolved   []  Increase void interval [] No change []  Improving [] Resolved   []  Increase PF strength [] No change []  Improving [] Resolved   []  Increase PF endurance [] No change []  Improving [] Resolved   []  Increase endurance [] No change []  Improving [] Resolved   []  Decrease # of pads [] No change []  Improving [] Resolved   []  Decrease pain [] No change []  Improving [] Resolved   []  Increased coordination [] No change []  Improving [] Resolved   []  Increased Bowel Frequency [] No change []  Improving [] Resolved      Patient will continue to benefit from skilled PT services to modify and progress therapeutic interventions, address functional mobility deficits, address ROM deficits, address strength deficits, analyze and address soft tissue restrictions, analyze and cue movement patterns, analyze and modify body mechanics/ergonomics, assess and modify postural abnormalities and instruct in home and community integration to attain remaining goals.      []  See Plan of Care  [x]  See progress note/recertification  []  See Discharge Summary      Progress towards goals / Updated goals:  Short Term Goals: To be accomplished in 4  weeks:  1. Patient will demonstrate home exercise program accurately as adjunct to PT clinic visits to promote healthy lifestyle and increase quality of life. Status @ Eval: initiated with abdominal massage  Current: added HEP for posture and initiated constipation management; good compliance with fiber intake 9-5-18      2. Patient will report ability to utilize Dilator progressively with no pain to promote decrease of symptoms and increase quality of life. Status @ Eval: provide as indicated    Current: issue S+ sized dilator 8-29-18      3. Patient will report pain decreased to 2-3/3 on Marinoff scale for more normal function and increased quality of life. Status @ Eval: 3/3   Current: no change 9-5-18      4. Patient will have decreased pain to 5-6/10 at most with sexual relations and gynecological exams. Status @ Eval: 10/10 at worst  Current: no change 9-5-18      Long Term Goals: To be accomplished in [facility]:  1. Patient will have FOTO score for PFDI Pain decreased by 5-8% points indicating improvement in function and report of no difficulty with maintaining intimate relations due to pain. Status @ Eval: 79  Current: will be assessed at 5th visit 9-5-18      2. Patient will be able to participate in sexual relations with no discomfort with 0-1/3 Marinoff score for increased quality of life and more normal function. Status @ Eval: 3/3   Current: no change 9-5-18      3. Patient will have decreased pain to 3/10 at most with sexual relations and gynecological exams. Status @ Eval: 10/10 at worst  Current: no change 9-5-18      4. Patient will report at least 50% improvement with stress UI to improve her QOL. Status @ Eval: couple leakage episodes within a month.   Current: no change 9-5-18      5. Patient will be independent with HEP at time of discharge to be able to continue with pelvic floor program.  Status @ Eval: initiated with abdominal massage Current: good compliance with HEP and fiber intake 9-5-18     PLAN  [x]  Upgrade activities as tolerated     [x]  Continue plan of care  []  Update interventions per flow sheet       []  Discharge due to:_  []  Other:_      Ward Mckinney, PT 9/5/2018  7:53 AM    Future Appointments  Date Time Provider Huyen Morris   9/5/2018 11:15 AM Sukhdev De Oliveira [facility]   9/12/2018 11:15 AM Sukhdev De Oliveira [facility]   9/18/2018 9:45 AM Tran Cantor MD [address] [facility] [place]   9/19/2018 10:15 AM Sukhdev Acosta [facility]   9/26/2018 11:00 AM Sukhdev De Oliveira [facility]

## 2018-09-12 ENCOUNTER — APPOINTMENT (OUTPATIENT)
Dept: PHYSICAL THERAPY | Age: 23
End: 2018-09-12
Payer: OTHER GOVERNMENT

## 2018-09-19 ENCOUNTER — HOSPITAL ENCOUNTER (OUTPATIENT)
Dept: PHYSICAL THERAPY | Age: 23
Discharge: HOME | End: 2018-09-19
Payer: OTHER GOVERNMENT

## 2018-09-19 PROCEDURE — 97112 NEUROMUSCULAR REEDUCATION: CPT

## 2018-09-19 PROCEDURE — 97530 THERAPEUTIC ACTIVITIES: CPT

## 2018-09-19 PROCEDURE — 97140 MANUAL THERAPY 1/> REGIONS: CPT

## 2018-09-19 NOTE — PROGRESS NOTES
PF DAILY TREATMENT NOTE 3-16    Patient Name: Mohamud Mejia  Date:2018  : 1995  [x]  Patient  Verified  Payor:  / Plan: Clayton Marie 73 / Product Type:  /    In time: 10:21  Out time:10:55  Total Treatment Time (min): 34  Visit #: 4 of 16    Treatment Area: [x] Pelvic Floor     [] Other:    SUBJECTIVE  Pain Level (0-10 scale): 4.5-5/10 with lower back  Any medication changes, allergies to medications, adverse drug reactions, diagnosis change, or new procedure performed?: [x] No    [] Yes (see summary sheet for update)  Subjective functional status/changes:   [] No changes reported  \"I'm really tired as my sone stayed up from 2 to 6 this morning. \" pt also report having endoscopy this afternoon; her LBP improved min since the shot    OBJECTIVE        9 min Therapeutic Activity:  []  See flow sheet :    [x]  Increase Tissue extensibility        [x]  Assess fiber intake    [x]  Assess voiding habits                                          [x]  Assess bowel habits  [x]  Other: review fluid intake, relaxation, dilator use (decrease to S size due to pain/tolerance)  Rationale: increase ROM, increase strength, improve coordination, improve balance and increase proprioception  to improve the patients ability to improve defecation dynamics and tolerance to ADLs      18 min Neuromuscular Re-education:  []  See flow sheet :   []  Pelvic floor strengthening                 []  Pelvic floor downtraining  []  Quality pelvic floor contractions       []  Relaxation techniques  []  Urge suppression exercises  []  Other:  Rationale: increase ROM, increase strength, improve coordination and increase proprioception  to improve the patients ability to tolerate ADLs and improve leakage    8 min Manual Therapy:   In-traviginal MFR, focussed on levator ani   Rationale: decrease pain, increase ROM, increase tissue extensibility, decrease trigger points and increase postural awareness to improve pt's tolerance for ADLs       With   [] TE   [] TA   [] neuro  [] manual   [] other: Patient Education: [x] Review HEP    [] Progressed/Changed HEP based on:   [] positioning   [] body mechanics   [] transfers   [] heat/ice application    [] other:      Other Objective/Functional Measures:   []baseline resting tone: 3.3 mV with spasm/contraction of PFM   []slow twitch mms Vanda's protocol   10 sec hold, 10 sec rest, 6 rep:   Trial 1, with hip add: work: 5.2 mV, rest: 5.1 mV   Trial 2, without hip add: work: 5.5 mV, rest: 2.7mV  Max fatigued and demonstrated overflow technique (Right toes flex)    []fast twitch mms    Pain Level (0-10 scale) post treatment: 5/10    ASSESSMENT/Changes in Function: pt demonstrated very poor coordination and endurance of PFM; max fatigued after 6 contractions. Demonstrated significant tones of levator ani. Will cont with hips, back stretching and internal manual next visit.      []  Decrease # of leaks [] No change []  Improving [] Resolved   []  Decrease hypertonus [] No change []  Improving [] Resolved   []  Increase void interval [] No change []  Improving [] Resolved   []  Increase PF strength [] No change []  Improving [] Resolved   []  Increase PF endurance [] No change []  Improving [] Resolved   []  Increase endurance [] No change []  Improving [] Resolved   []  Decrease # of pads [] No change []  Improving [] Resolved   []  Decrease pain [] No change []  Improving [] Resolved   []  Increased coordination [] No change []  Improving [] Resolved   []  Increased Bowel Frequency [] No change []  Improving [] Resolved       Patient will continue to benefit from skilled PT services to modify and progress therapeutic interventions, address functional mobility deficits, address ROM deficits, address strength deficits, analyze and address soft tissue restrictions, analyze and cue movement patterns, analyze and modify body mechanics/ergonomics, assess and modify postural abnormalities and instruct in home and community integration to attain remaining goals.      []  See Plan of Care  [x]  See progress note/recertification  []  See Discharge Summary      Progress towards goals / Updated goals:  Short Term Goals: To be accomplished in 4  weeks:  1. Patient will demonstrate home exercise program accurately as adjunct to PT clinic visits to promote healthy lifestyle and increase quality of life. Status @ Eval: initiated with abdominal massage  Current: added HEP for posture and initiated constipation management; good compliance with fiber intake 9-5-18      2. Patient will report ability to utilize Dilator progressively with no pain to promote decrease of symptoms and increase quality of life. Status @ Eval: provide as indicated    Current: issue S+ sized dilator 8-29-18; unable to tolerate S+, decreased to S 9-19-18      3. Patient will report pain decreased to 2-3/3 on Marinoff scale for more normal function and increased quality of life. Status @ Eval: 3/3   Current: no change 9-5-18      4. Patient will have decreased pain to 5-6/10 at most with sexual relations and gynecological exams. Status @ Eval: 10/10 at worst  Current: no change 9-5-18      Long Term Goals: To be accomplished in [facility]:  1. Patient will have FOTO score for PFDI Pain decreased by 5-8% points indicating improvement in function and report of no difficulty with maintaining intimate relations due to pain. Status @ Eval: 79  Current: will be assessed at 5th visit 9-5-18      2. Patient will be able to participate in sexual relations with no discomfort with 0-1/3 Marinoff score for increased quality of life and more normal function. Status @ Eval: 3/3   Current: no change 9-5-18      3. Patient will have decreased pain to 3/10 at most with sexual relations and gynecological exams. Status @ Eval: 10/10 at worst  Current: no change 9-5-18      4.  Patient will report at least 50% improvement with stress UI to improve her QOL.  Status @ Eval: couple leakage episodes within a month.   Current: no change 9-5-18      5. Patient will be independent with HEP at time of discharge to be able to continue with pelvic floor program.  Status @ Eval: initiated with abdominal massage   Current: good compliance with HEP and fiber intake 9-5-18      PLAN  [x]  Upgrade activities as tolerated     [x]  Continue plan of care  []  Update interventions per flow sheet       []  Discharge due to:_  []  Other:_      Malorie Postal, PT 9/19/2018  8:01 AM    Future Appointments  Date Time Provider Huyen Morris   9/19/2018 10:15 AM Sukhdev Ewing [facility]   9/26/2018 11:00 AM Aravind Ewing [facility]

## 2018-09-20 ENCOUNTER — DOCUMENTATION ONLY (OUTPATIENT)
Dept: ONCOLOGY | Age: 23
End: 2018-09-20

## 2018-09-20 NOTE — PROGRESS NOTES
Tried to call patient back @ [phone] to reschedule lab redraws from 8/28/2018 & 8/31/2018. The frozen specimens were never picked up. Left a message to call us back.

## 2018-09-25 ENCOUNTER — HOSPITAL ENCOUNTER (OUTPATIENT)
Dept: LAB | Age: 23
Discharge: HOME | End: 2018-09-25
Payer: OTHER GOVERNMENT

## 2018-09-25 DIAGNOSIS — D50.0 CHRONIC BLOOD LOSS ANEMIA: ICD-10-CM

## 2018-09-25 LAB
ALBUMIN SERPL-MCNC: 3.3 G/DL (ref 3.4–5)
ALBUMIN/GLOB SERPL: 0.8 {RATIO} (ref 0.8–1.7)
ALP SERPL-CCNC: 45 U/L (ref 45–117)
ALT SERPL-CCNC: 47 U/L (ref 13–56)
ANION GAP SERPL CALC-SCNC: 10 MMOL/L (ref 3–18)
AST SERPL-CCNC: 17 U/L (ref 15–37)
BILIRUB SERPL-MCNC: 0.1 MG/DL (ref 0.2–1)
BUN SERPL-MCNC: 11 MG/DL (ref 7–18)
BUN/CREAT SERPL: 14 (ref 12–20)
CALCIUM SERPL-MCNC: 8.4 MG/DL (ref 8.5–10.1)
CHLORIDE SERPL-SCNC: 106 MMOL/L (ref 100–108)
CO2 SERPL-SCNC: 26 MMOL/L (ref 21–32)
CREAT SERPL-MCNC: 0.79 MG/DL (ref 0.6–1.3)
GLOBULIN SER CALC-MCNC: 3.9 G/DL (ref 2–4)
GLUCOSE SERPL-MCNC: 58 MG/DL (ref 74–99)
POTASSIUM SERPL-SCNC: 3.2 MMOL/L (ref 3.5–5.5)
PROT SERPL-MCNC: 7.2 G/DL (ref 6.4–8.2)
SODIUM SERPL-SCNC: 142 MMOL/L (ref 136–145)

## 2018-09-25 PROCEDURE — 36415 COLL VENOUS BLD VENIPUNCTURE: CPT | Performed by: INTERNAL MEDICINE

## 2018-09-25 PROCEDURE — 80053 COMPREHEN METABOLIC PANEL: CPT | Performed by: INTERNAL MEDICINE

## 2018-09-25 PROCEDURE — 85260 CLOT FACTOR X STUART-POWER: CPT | Performed by: INTERNAL MEDICINE

## 2018-09-25 PROCEDURE — 85240 CLOT FACTOR VIII AHG 1 STAGE: CPT | Performed by: INTERNAL MEDICINE

## 2018-09-25 PROCEDURE — 85291 CLOT FACTOR XIII FIBRIN SCRN: CPT | Performed by: INTERNAL MEDICINE

## 2018-09-26 ENCOUNTER — HOSPITAL ENCOUNTER (OUTPATIENT)
Dept: PHYSICAL THERAPY | Age: 23
Discharge: HOME | End: 2018-09-26
Payer: OTHER GOVERNMENT

## 2018-09-26 PROCEDURE — 97140 MANUAL THERAPY 1/> REGIONS: CPT

## 2018-09-26 PROCEDURE — 97530 THERAPEUTIC ACTIVITIES: CPT

## 2018-09-26 PROCEDURE — 97110 THERAPEUTIC EXERCISES: CPT

## 2018-09-26 NOTE — PROGRESS NOTES
PF DAILY TREATMENT NOTE 3-    Patient Name: Nikkie Glass  Date:2018  : 1995  [x]  Patient  Verified  Payor: MARLA / Plan: Clayton Marie 73 / Product Type:  /    In time: 11:02  Out time: 11:38  Total Treatment Time (min): 36  Visit #:  of 16    Treatment Area: [x] Pelvic Floor     [] Other:    SUBJECTIVE  Pain Level (0-10 scale): 510  Any medication changes, allergies to medications, adverse drug reactions, diagnosis change, or new procedure performed?: [x] No    [] Yes (see summary sheet for update)  Subjective functional status/changes:   [] No changes reported  Pt reported persistent tightness of Right PFM; pt notices improvement with leakage; her back MD wants to do another injection for her back; MRI only showed arthritis, no other significant findings    OBJECTIVE    16 min Therapeutic Exercise:  [x] See flow sheet : HEP/self correction for pelvic, Jil Row/ext   []  Pelvic floor strengthening                 []  Pelvic floor downtraining  []  Quality pelvic floor contractions       []  Relaxation techniques  []  Urge suppression exercises  []  Other:  Rationale: increase ROM, increase strength, improve coordination, improve balance and increase proprioception  to improve the patients ability to perform ADLs with ease     10 min Therapeutic Activity:  []  See flow sheet :    [x]  Increase Tissue extensibility        [x]  Assess fiber intake    [x]  Assess voiding habits                                          [x]  Assess bowel habits  [x]  Other: review fluid intake, relaxation, dilator use, stretching and strengthening frequency/sequence  Rationale: increase ROM, increase strength, improve coordination, improve balance and increase proprioception  to improve the patients ability to improve defecation dynamics and tolerance to ADLs       10 min Manual Therapy:  pelvic alignment assessment, MET to correct Right upslip and ant rot   Rationale: decrease pain, increase ROM, increase tissue extensibility, decrease trigger points and increase postural awareness to improve pt's tolerance for ADLs       With   [] TE   [] TA   [] neuro  [] manual   [] other: Patient Education: [x] Review HEP    [] Progressed/Changed HEP based on:   [] positioning   [] body mechanics   [] transfers   [] heat/ice application    [] other:      Other Objective/Functional Measures:   []baseline resting tone:   []slow twitch mms   []fast twitch mms   Min improvement with pain after manual   Max fatigued with therex for posture   Max difficulty with bridges    Pain Level (0-10 scale) post treatment: 5/10    ASSESSMENT/Changes in Function: pt reports some improvement with leakage. She cont to c/o mod back pain, demonstrated obliquity of pelvic, and poor hips and core strength.  Will cont with internal manual and hip/core strengthening therex next visit.      [x]  Decrease # of leaks [] No change [x]  Improving [] Resolved   [x]  Decrease hypertonus [] No change [x]  Improving [] Resolved   []  Increase void interval [] No change []  Improving [] Resolved   []  Increase PF strength [] No change []  Improving [] Resolved   []  Increase PF endurance [] No change []  Improving [] Resolved   []  Increase endurance [] No change []  Improving [] Resolved   []  Decrease # of pads [] No change []  Improving [] Resolved   [x]  Decrease pain [] No change [x]  Improving [] Resolved   []  Increased coordination [] No change []  Improving [] Resolved   []  Increased Bowel Frequency [] No change []  Improving [] Resolved       Patient will continue to benefit from skilled PT services to modify and progress therapeutic interventions, address functional mobility deficits, address ROM deficits, address strength deficits, analyze and address soft tissue restrictions, analyze and cue movement patterns, analyze and modify body mechanics/ergonomics, assess and modify postural abnormalities and instruct in home and community integration to attain remaining goals.      []  See Plan of Care  [x]  See progress note/recertification  []  See Discharge Summary      Progress towards goals / Updated goals:  Short Term Goals: To be accomplished in 4  weeks:  1. Patient will demonstrate home exercise program accurately as adjunct to PT clinic visits to promote healthy lifestyle and increase quality of life. Status @ Eval: initiated with abdominal massage  Current: added HEP for posture and initiated constipation management; good compliance with fiber intake 9-5-18      2. Patient will report ability to utilize Dilator progressively with no pain to promote decrease of symptoms and increase quality of life. Status @ Eval: provide as indicated    Current: issue S+ sized dilator 8-29-18; unable to tolerate S+, decreased to S 9-19-18      3. Patient will report pain decreased to 2-3/3 on Marinoff scale for more normal function and increased quality of life. Status @ Eval: 3/3   Current: no change 9-5-18      4. Patient will have decreased pain to 5-6/10 at most with sexual relations and gynecological exams. Status @ Eval: 10/10 at worst  Current: no change 9-5-18      Long Term Goals: To be accomplished in [facility]:  1. Patient will have FOTO score for PFDI Pain decreased by 5-8% points indicating improvement in function and report of no difficulty with maintaining intimate relations due to pain. Status @ Eval: 79  Current: will be assessed at 5th visit 9-5-18      2. Patient will be able to participate in sexual relations with no discomfort with 0-1/3 Marinoff score for increased quality of life and more normal function. Status @ Eval: 3/3   Current: no change 9-5-18      3. Patient will have decreased pain to 3/10 at most with sexual relations and gynecological exams. Status @ Eval: 10/10 at worst  Current: no change 9-5-18      4. Patient will report at least 50% improvement with stress UI to improve her QOL.   Status @ Eval: couple leakage episodes within a month.   Current: no change 9-5-18; pt reports some improvement with leakage 9-26-18      5. Patient will be independent with HEP at time of discharge to be able to continue with pelvic floor program.  Status @ Eval: initiated with abdominal massage   Current: good compliance with HEP and fiber intake 9-5-18      PLAN  [x]  Upgrade activities as tolerated     [x]  Continue plan of care  []  Update interventions per flow sheet       []  Discharge due to:_  []  Other:_      Jacinda Acosta, PT 9/26/2018  8:20 AM    Future Appointments  Date Time Provider Huyen Morris   9/26/2018 11:00 AM Thienphuc Clearence Dykes [facility]   10/3/2018 10:30 AM Thienphuc Clearence Dykes [facility]   10/10/2018 10:30 AM Thienphuc Clearence Dykes [facility]   10/12/2018 2:15 PM Prosper Torres MD [address] [place] [address]   10/17/2018 2:00 PM Thienphuc Clearence Dykes [facility]   10/24/2018 2:00 PM Thienphuc Clearence Dykes [facility]   10/31/2018 10:30 AM Thienphuc Clearence Dykes [facility]

## 2018-09-28 LAB
FACT VIII ACT/NOR PPP: 67 % (ref 57–163)
FACT X ACT/NOR PPP: 128 % (ref 76–183)
FACT XIII CLOT DIS 24H PPP QL: NORMAL
INTERPRETATION, 910378, CSIR1: ABNORMAL
VWF AG ACT/NOR PPP IA: 53 % (ref 50–200)
VWF:RCO ACT/NOR PPP PL AGG: 38 % (ref 50–200)

## 2018-10-03 ENCOUNTER — HOSPITAL ENCOUNTER (OUTPATIENT)
Dept: PHYSICAL THERAPY | Age: 23
Discharge: HOME | End: 2018-10-03
Payer: OTHER GOVERNMENT

## 2018-10-03 PROCEDURE — 97530 THERAPEUTIC ACTIVITIES: CPT

## 2018-10-03 PROCEDURE — 97112 NEUROMUSCULAR REEDUCATION: CPT

## 2018-10-03 NOTE — PROGRESS NOTES
[facility] - Elver Lowery  [address] [facility]  [phone] fax    Pelvic Floor Progress Note  Patient name: [facility] Start of Care: 2018   Referral source: Patrick Hampton MD : 1995                         Medical Diagnosis: Pelvic and perineal pain [R10.2] Onset Date: chronic, about 10 years                         Treatment Diagnosis: PFD, stress UI   Prior Hospitalization: see medical history Provider#: [id number]   Medications: Verified on Patient summary List    Comorbidities: , l/s compression fracture, Left hip avulsion fracture, depression, anxiety, PTSD, bordeline personality disorder, asthma   Prior Level of Function: less pain with all mobility, less leakage episodes    Visits from Start of Care: 6    Missed Visits: 0    Established Goals:           Excellent Good         Limited           None  [] Increase Pelvic MM strength []  []  []  []  [x] Decrease Pelvic MM hypertonus []  []  [x]  []  [x] Decrease Incontinence Episodes []  [x]  []  []   [x] Improve Voiding Habits  []  [x]  []  []  [] Decreased Urgency   []  []  []  []    Key Functional Changes: improving leakage, min improvement with LBP    Updated Goals: to be achieved in 6 weeks:  Short Term Goals: To be accomplished in 3 weeks:  1. Patient will demonstrate home exercise program accurately as adjunct to PT clinic visits to promote healthy lifestyle and increase quality of life. Status @ Eval: initiated with abdominal massage  Current: added HEP for posture and initiated constipation management; good compliance with fiber intake 18      2. Patient will report ability to utilize Dilator progressively with no pain to promote decrease of symptoms and increase quality of life. Status @ Eval: provide as indicated    Current: issue S+ sized dilator 18; unable to tolerate S+, decreased to S 18      3.  Patient will report pain decreased to 2-3/3 on Marinoff scale for more normal function and increased quality of life. Status @ Eval: 3/3   Current: no change 9-5-18, no change 10-3-18      4. Patient will have decreased pain to 5-6/10 at most with sexual relations and gynecological exams. Status @ Eval: 10/10 at worst  Current: no change 9-5-18, mod pain 4-5/10 10-3-18      Long Term Goals: To be accomplished in 6 weeks:  1. Patient will have FOTO score for PFDI Pain decreased by 5-8% points indicating improvement in function and report of no difficulty with maintaining intimate relations due to pain. Status @ Eval: 78  Current: will be assessed at 5th visit 9-5-18, will be assessed next time due to clinician's error 10-3-18      2. Patient will be able to participate in sexual relations with no discomfort with 0-1/3 Marinoff score for increased quality of life and more normal function. Status @ Eval: 3/3   Current: no change 9-5-18, no change 10-3-18      3. Patient will have decreased pain to 3/10 at most with sexual relations and gynecological exams. Status @ Eval: 10/10 at worst  Current: no change 9-5-18, mod pain 4-5/10 10-3-18      4. Patient will report at least 50% improvement with stress UI to improve her QOL. Status @ Eval: couple leakage episodes within a month. Current: no change 9-5-18; pt reports some improvement with leakage 9-26-18, no leakage since last visit 10-3-18      5. Patient will be independent with HEP at time of discharge to be able to continue with pelvic floor program.  Status @ Eval: initiated with abdominal massage   Current: good compliance with HEP and fiber intake 9-5-18    ASSESSMENT/RECOMMENDATIONS: pt making slow progress, partially due to comorbidity and elevated stress level. Pt reports no leakage since last visit and improving LBP min, however, she reports no change with pelvic pain; didn't try intimacy.  Pt cont to demonstrated fair tolerance, mod fatigue/low energy level, poor overall strength and hypertonicity of PFM. Pt would cont to benefit from skilled PT to address these limitation and promote pain free mobility. [x]Continue therapy per initial plan/protocol at a frequency of  1-2 x per week for 6 weeks  []Continue therapy with the following recommended changes:_____________________      _____________________________________________________________________  []Discontinue therapy progressing towards or have reached established goals  []Discontinue therapy due to lack of appreciable progress towards goals  []Discontinue therapy due to lack of attendance or compliance  []Await Physician's recommendations/decisions regarding therapy  []Other:________________________________________________________________    Thank you for this referral.   Etheleen Aschoff, PT 10/3/2018 1:50 PM  NOTE TO PHYSICIAN:  PLEASE COMPLETE THE ORDERS BELOW AND   FAX TO [facility]: [phone]  If you are unable to process this request in 24 hours please contact our office: [phone] I have read the above report and request that my patient continue as recommended. ? I have read the above report and request that my patient continue therapy with the following changes/special instructions:___________________________________________________________  ? I have read the above report and request that my patient be discharged from therapy.     [address] [place] [address] Signature:____________Date:_________TIME:________    [facility], Date and Time must be completed for valid certification **

## 2018-10-03 NOTE — PROGRESS NOTES
PF DAILY TREATMENT NOTE 3-16    Patient Name: Joan Salinas  Date:10/3/2018  : 1995  [x]  Patient  Verified  Payor: MARLA / Plan: Clayton Marie 73 / Product Type:  /    In time: 10:31  Out time: 11:14  Total Treatment Time (min): 43  Visit #: 6 of 16    Treatment Area: [x] Pelvic Floor     [] Other:    SUBJECTIVE  Pain Level (0-10 scale): 4.5/10  Any medication changes, allergies to medications, adverse drug reactions, diagnosis change, or new procedure performed?: [x] No    [] Yes (see summary sheet for update)  Subjective functional status/changes:   [] No changes reported  Pt reports being diagnosed with IBS and would follow up with PCP for appropriate . She had a very bad/stressful week with multiple family problem    OBJECTIVE          10 min Therapeutic Activity:  []  See flow sheet :    [x]  Increase Tissue extensibility        [x]  Assess fiber intake    [x]  Assess voiding habits                                          [x]  Assess bowel habits  [x]  Other: healthy defecation and urination dynamics  Rationale: increase ROM, increase strength, improve coordination, improve balance and increase proprioception  to improve the patients ability to improve defecation dynamics and tolerance to ADLs        33 min Neuromuscular Re-education:  [x]  See flow sheet :   []  Pelvic floor strengthening                 []  Pelvic floor downtraining  []  Quality pelvic floor contractions       [x]  Relaxation techniques  []  Urge suppression exercises  [x]  Other:  Yoga poses, and core/hip/PFM coordination/strengthening therex  Rationale: increase ROM, increase strength, improve coordination, improve balance and increase proprioception  to improve the patients ability to perform ADLs with ease            With   [] TE   [] TA   [] neuro  [] manual   [] other: Patient Education: [x] Review HEP    [] Progressed/Changed HEP based on:   [] positioning   [] body mechanics   [] transfers   [] heat/ice application    [] other:      Other Objective/Functional Measures:   []baseline resting tone:   []slow twitch mms   []fast twitch mms   Max shaking with core and hips strengthening therex    Pain Level (0-10 scale) post treatment: 4.5/10    ASSESSMENT/Changes in Function: See progress note/recertification    [x]  Decrease # of leaks [] No change [x]  Improving [] Resolved   [x]  Decrease hypertonus [] No change [x]  Improving [] Resolved   []  Increase void interval [] No change []  Improving [] Resolved   []  Increase PF strength [] No change []  Improving [] Resolved   []  Increase PF endurance [] No change []  Improving [] Resolved   []  Increase endurance [] No change []  Improving [] Resolved   []  Decrease # of pads [] No change []  Improving [] Resolved   [x]  Decrease pain [] No change [x]  Improving [] Resolved   []  Increased coordination [] No change []  Improving [] Resolved   []  Increased Bowel Frequency [] No change []  Improving [] Resolved       Patient will continue to benefit from skilled PT services to modify and progress therapeutic interventions, address functional mobility deficits, address ROM deficits, address strength deficits, analyze and address soft tissue restrictions, analyze and cue movement patterns, analyze and modify body mechanics/ergonomics, assess and modify postural abnormalities and instruct in home and community integration to attain remaining goals.      []  See Plan of Care  [x]  See progress note/recertification  []  See Discharge Summary      Progress towards goals / Updated goals:  Short Term Goals: To be accomplished in 4  weeks:  1. Patient will demonstrate home exercise program accurately as adjunct to PT clinic visits to promote healthy lifestyle and increase quality of life. Status @ Eval: initiated with abdominal massage  Current: added HEP for posture and initiated constipation management; good compliance with fiber intake 9-5-18      2.  Patient will report ability to utilize Dilator progressively with no pain to promote decrease of symptoms and increase quality of life. Status @ Eval: provide as indicated    Current: issue S+ sized dilator 8-29-18; unable to tolerate S+, decreased to S 9-19-18      3. Patient will report pain decreased to 2-3/3 on Marinoff scale for more normal function and increased quality of life. Status @ Eval: 3/3   Current: no change 9-5-18, no change 10-3-18      4. Patient will have decreased pain to 5-6/10 at most with sexual relations and gynecological exams. Status @ Eval: 10/10 at worst  Current: no change 9-5-18, mod pain 4-5/10 10-3-18      Long Term Goals: To be accomplished in 8  weeks:  1. Patient will have FOTO score for PFDI Pain decreased by 5-8% points indicating improvement in function and report of no difficulty with maintaining intimate relations due to pain. Status @ Eval: 78  Current: will be assessed at 5th visit 9-5-18, will be assessed next time due to clinician's error 10-3-18      2. Patient will be able to participate in sexual relations with no discomfort with 0-1/3 Marinoff score for increased quality of life and more normal function. Status @ Eval: 3/3   Current: no change 9-5-18, no change 10-3-18      3. Patient will have decreased pain to 3/10 at most with sexual relations and gynecological exams. Status @ Eval: 10/10 at worst  Current: no change 9-5-18, mod pain 4-5/10 10-3-18      4. Patient will report at least 50% improvement with stress UI to improve her QOL. Status @ Eval: couple leakage episodes within a month.   Current: no change 9-5-18; pt reports some improvement with leakage 9-26-18, no leakage since last visit 10-3-18      5. Patient will be independent with HEP at time of discharge to be able to continue with pelvic floor program.  Status @ Eval: initiated with abdominal massage   Current: good compliance with HEP and fiber intake 9-5-18      PLAN  [x]  Upgrade activities as tolerated     [x]  Continue plan of care  []  Update interventions per flow sheet       []  Discharge due to:_  []  Other:_      Cata Lazcano, PT 10/3/2018  10:32 AM    Future Appointments  Date Time Provider Huyen Morris   10/10/2018 10:30 AM Sukhdev Yuen [facility]   10/12/2018 2:15 PM Merlene Dominguez MD [id number] [facility]   10/17/2018 2:00 PM Sukhdev Yuen [facility]   10/24/2018 2:00 PM Sukhdev Yuen [facility]   10/31/2018 10:30 AM Zachary Yuen [facility]

## 2018-10-10 ENCOUNTER — HOSPITAL ENCOUNTER (OUTPATIENT)
Dept: PHYSICAL THERAPY | Age: 23
Discharge: HOME | End: 2018-10-10
Payer: OTHER GOVERNMENT

## 2018-10-10 PROCEDURE — 97530 THERAPEUTIC ACTIVITIES: CPT

## 2018-10-10 PROCEDURE — 97140 MANUAL THERAPY 1/> REGIONS: CPT

## 2018-10-10 PROCEDURE — 97014 ELECTRIC STIMULATION THERAPY: CPT

## 2018-10-10 NOTE — PROGRESS NOTES
PF DAILY TREATMENT NOTE 3-16    Patient Name: Moni Jasmine  Date:10/10/2018  : 1995  [x]  Patient  Verified  Payor: MARLA / Plan: Marleny Santillan / Product Type: Raza Chand /    In time:10:29  Out time:11:13  Total Treatment Time (min): 44  Visit #: 7 of 16    Treatment Area: [x] Pelvic Floor     [] Other:    SUBJECTIVE  Pain Level (0-10 scale): 410  Any medication changes, allergies to medications, adverse drug reactions, diagnosis change, or new procedure performed?: [x] No    [] Yes (see summary sheet for update)  Subjective functional status/changes:   [] No changes reported  Pt reports planning for hysterectomy early next year.     OBJECTIVE    Modality rationale: decrease pain and increase tissue extensibility to improve the patients ability to tolerate internal manual   Min Type Additional Details   10 [x] Estim:  []Unatt       [x]IFC  []Premod                        []Other:  []w/ice   [x]w/heat  Position: supine  Location: pelvic area    [] Estim: []Att    []TENS instruct  []NMES                    []Other:  []w/US   []w/ice   []w/heat  Position:  Location:    []  Ultrasound: []Continuous   [] Pulsed                           []1MHz   []3MHz Position:  Location:    []  Ice     []  heat  []  Ice massage  []  Laser   []  Anodyne Position:  Location:   [] Skin assessment post-treatment:  []intact []redness- no adverse reaction    []redness - adverse reaction:          23 min Therapeutic Activity:  []  See flow sheet :    [x]  Increase Tissue extensibility        [x]  Assess fiber intake    [x]  Assess voiding habits                                          [x]  Assess bowel habits  [x]  Other: stress management   Rationale: increase ROM, increase strength, improve coordination, improve balance and increase proprioception  to improve the patients ability to improve defecation dynamics and tolerance to ADLs       11 min Manual Therapy:  Intravaginal MFR, focus on B OI   Rationale: decrease pain, increase ROM, increase tissue extensibility and decrease trigger points to improve tolerance for ADLs          With   [] TE   [] TA   [] neuro  [] manual   [] other: Patient Education: [x] Review HEP    [] Progressed/Changed HEP based on:   [] positioning   [] body mechanics   [] transfers   [] heat/ice application    [] other:      Other Objective/Functional Measures:   []baseline resting tone:   []slow twitch mms   []fast twitch mms   Max tightness of B OI, worst with Left, max pain with manual    Pain Level (0-10 scale) post treatment: 2/10    ASSESSMENT/Changes in Function:  Pt cont to report no leakage but fluctuating with mod pain. Pt was also opened to PT today on her family and mental problem; extensive education on stress management/meditation. Considering PFM, pt demonstrated max tightness and tenderness with B OI, fair tolerance during manual; good pain relief afterwards. Will cont with internal manual next visit.      [x]  Decrease # of leaks [] No change [x]  Improving [] Resolved   [x]  Decrease hypertonus [] No change [x]  Improving [] Resolved   []  Increase void interval [] No change []  Improving [] Resolved   []  Increase PF strength [] No change []  Improving [] Resolved   []  Increase PF endurance [] No change []  Improving [] Resolved   []  Increase endurance [] No change []  Improving [] Resolved   []  Decrease # of pads [] No change []  Improving [] Resolved   [x]  Decrease pain [] No change [x]  Improving [] Resolved   []  Increased coordination [] No change []  Improving [] Resolved   []  Increased Bowel Frequency [] No change []  Improving [] Resolved       Patient will continue to benefit from skilled PT services to modify and progress therapeutic interventions, address functional mobility deficits, address ROM deficits, address strength deficits, analyze and address soft tissue restrictions, analyze and cue movement patterns, analyze and modify body mechanics/ergonomics, assess and modify postural abnormalities and instruct in home and community integration to attain remaining goals.      []  See Plan of Care  [x]  See progress note/recertification  []  See Discharge Summary      Progress towards goals / Updated goals:  Short Term Goals: To be accomplished in 4  weeks:  1. Patient will demonstrate home exercise program accurately as adjunct to PT clinic visits to promote healthy lifestyle and increase quality of life. Status @ Eval: initiated with abdominal massage  Current: added HEP for posture and initiated constipation management; good compliance with fiber intake 9-5-18      2. Patient will report ability to utilize Dilator progressively with no pain to promote decrease of symptoms and increase quality of life. Status @ Eval: provide as indicated    Current: issue S+ sized dilator 8-29-18; unable to tolerate S+, decreased to S 9-19-18      3. Patient will report pain decreased to 2-3/3 on Marinoff scale for more normal function and increased quality of life. Status @ Eval: 3/3   Current: no change 9-5-18, no change 10-3-18      4. Patient will have decreased pain to 5-6/10 at most with sexual relations and gynecological exams. Status @ Eval: 10/10 at worst  Current: no change 9-5-18, mod pain 4-5/10 10-3-18      Long Term Goals: To be accomplished in 8  weeks:  1. Patient will have FOTO score for PFDI Pain decreased by 5-8% points indicating improvement in function and report of no difficulty with maintaining intimate relations due to pain. Status @ Eval: 78  Current: will be assessed at 5th visit 9-5-18, will be assessed next time due to clinician's error 10-3-18      2. Patient will be able to participate in sexual relations with no discomfort with 0-1/3 Marinoff score for increased quality of life and more normal function. Status @ Eval: 3/3   Current: no change 9-5-18, no change 10-3-18      3.  Patient will have decreased pain to 3/10 at most with sexual relations and gynecological exams. Status @ Eval: 10/10 at worst  Current: no change 9-5-18, mod pain 4-5/10 10-3-18      4. Patient will report at least 50% improvement with stress UI to improve her QOL. Status @ Eval: couple leakage episodes within a month.   Current: no change 9-5-18; pt reports some improvement with leakage 9-26-18, no leakage since last visit 10-3-18      5. Patient will be independent with HEP at time of discharge to be able to continue with pelvic floor program.  Status @ Eval: initiated with abdominal massage   Current: good compliance with HEP and fiber intake 9-5-18      PLAN  [x]  Upgrade activities as tolerated     [x]  Continue plan of care  []  Update interventions per flow sheet       []  Discharge due to:_  []  Other:_      Thor Quinn, PT 10/10/2018  7:53 AM    Future Appointments  Date Time Provider Huyen Morris   10/10/2018 10:30 AM Thienphuc Francis Ho [facility]   10/12/2018 2:15 PM Baron Erick MD [address] [facility]   10/17/2018 2:00 PM Thienphuc Stephon Ho [facility]   10/24/2018 2:00 PM Thienphuc Stephon Ho [facility]   10/31/2018 10:30 AM Thienphuc Stephon Ho [facility]

## 2018-10-12 ENCOUNTER — OFFICE VISIT (OUTPATIENT)
Dept: ONCOLOGY | Age: 23
End: 2018-10-12

## 2018-10-12 VITALS
TEMPERATURE: 98.5 F | SYSTOLIC BLOOD PRESSURE: 111 MMHG | WEIGHT: 152 LBS | DIASTOLIC BLOOD PRESSURE: 77 MMHG | HEIGHT: 65 IN | HEART RATE: 85 BPM | RESPIRATION RATE: 16 BRPM | BODY MASS INDEX: 25.33 KG/M2

## 2018-10-12 DIAGNOSIS — N93.8 DYSFUNCTIONAL UTERINE BLEEDING: ICD-10-CM

## 2018-10-12 DIAGNOSIS — D50.0 CHRONIC BLOOD LOSS ANEMIA: Primary | ICD-10-CM

## 2018-10-12 NOTE — PATIENT INSTRUCTIONS
Abnormal Uterine Bleeding: Care Instructions  Your Care Instructions    Abnormal uterine bleeding (AUB) is irregular bleeding from the uterus that is longer or heavier than usual or does not occur at your regular time. Sometimes it is caused by changes in hormone levels. It can also be caused by growths in the uterus, such as fibroids or polyps. Sometimes a cause cannot be found. You may have heavy bleeding when you are not expecting your period. Your doctor may suggest a pregnancy test, if you think you are pregnant. Follow-up care is a key part of your treatment and safety. Be sure to make and go to all appointments, and call your doctor if you are having problems. It's also a good idea to know your test results and keep a list of the medicines you take. How can you care for yourself at home? · Be safe with medicines. Take pain medicines exactly as directed. ¨ If the doctor gave you a prescription medicine for pain, take it as prescribed. ¨ If you are not taking a prescription pain medicine, ask your doctor if you can take an over-the-counter medicine. · You may be low in iron because of blood loss. Eat a balanced diet that is high in iron and vitamin C. Foods rich in iron include red meat, shellfish, eggs, beans, and leafy green vegetables. Talk to your doctor about whether you need to take iron pills or a multivitamin. When should you call for help? Call 911 anytime you think you may need emergency care. For example, call if:    · You passed out (lost consciousness).    Call your doctor now or seek immediate medical care if:    · You have new or worse belly or pelvic pain.     · You have severe vaginal bleeding.     · You feel dizzy or lightheaded, or you feel like you may faint.    Watch closely for changes in your health, and be sure to contact your doctor if:    · You think you may be pregnant.     · Your bleeding gets worse.     · You do not get better as expected.    Where can you learn more?  Go to http://shayy-keira.info/. Enter N301 in the search box to learn more about \"Abnormal Uterine Bleeding: Care Instructions. \"  Current as of: May 15, 2018  Content Version: 11.8  © 3138-1884 [employer], [employer]. Care instructions adapted under license by [employer] (which disclaims liability or warranty for this information). If you have questions about a medical condition or this instruction, always ask your healthcare professional. [place] [address] any warranty or liability for your use of this information.

## 2018-10-12 NOTE — MR AVS SNAPSHOT
[address] [place] [address] [address] [place] [address] 
[phone] Patient: Enrique Hidalgo MRN: [id number] :1995 Visit Information Date & Time Provider Department Dept. Phone Encounter #  
 10/12/2018  2:15 PM MD [facility]  [phone] [id number] Follow-up Instructions Return in about 3 months (around 2019). Upcoming Health Maintenance Date Due  
 HPV Age 9Y-34Y (1 of 1 - Female 3 Dose Series) 2005 DTaP/Tdap/Td series (1 - Tdap) 2015 PAP AKA CERVICAL CYTOLOGY 2015 Influenza Age 5 to Adult 2018 Allergies as of 10/12/2018  Review Complete On: 10/12/2018 By: Bard Santos MD  
 No Known Allergies Current Immunizations  Never Reviewed No immunizations on file. Not reviewed this visit You Were Diagnosed With   
  
 Codes Comments Chronic blood loss anemia    -  Primary ICD-10-CM: D50.0 ICD-9-CM: 280.0 Dysfunctional uterine bleeding     ICD-10-CM: N93.8 ICD-9-CM: 626.8 Vitals BP Pulse Temp Resp Height(growth percentile) Weight(growth percentile) 111/77 85 98.5 °F (36.9 °C) (Oral) 16 5' 5\" (1.651 m) 152 lb (68.9 kg) BMI Smoking Status 25.29 kg/m2 Never Smoker BMI and BSA Data Body Mass Index Body Surface Area  
 25.29 kg/m 2 1.78 m 2 Your Updated Medication List  
  
   
This list is accurate as of 10/12/18  2:32 PM.  Always use your most recent med list.  
  
  
  
  
 * TYLENOL EXTRA STRENGTH 500 mg tablet Generic drug:  acetaminophen * acetaminophen 325 mg tablet Commonly known as:  TYLENOL  
  
 ARIPiprazole 5 mg tablet Commonly known as:  ABILIFY  
  
 chlorzoxazone 500 mg tablet Commonly known as:  PARAFON FORTE  
  
 clonazePAM 0.5 mg tablet Commonly known as:  KlonoPIN  
  
 DEPAKOTE 500 mg tablet Generic drug:  divalproex   
  
 doxycycline 100 mg capsule Commonly known as:  VIBRAMYCIN  
  
 ibuprofen 800 mg tablet Commonly known as:  MOTRIN  
  
 JOLESSA 0.15 mg-30 mcg 3mpk Generic drug:  levonorgestrel-ethinyl estradiol  
  
 lidocaine 5 % Commonly known as:  LIDODERM  
  
 lithium carbonate 300 mg capsule  
  
 metroNIDAZOLE 500 mg tablet Commonly known as:  FLAGYL  
  
 naproxen 500 mg tablet Commonly known as:  NAPROSYN  
  
 ondansetron 4 mg disintegrating tablet Commonly known as:  ZOFRAN ODT  
  
 oxyCODONE IR 5 mg immediate release tablet Commonly known as:  ROXICODONE  
  
 pantoprazole 40 mg tablet Commonly known as:  PROTONIX  
  
 prazosin 1 mg capsule Commonly known as:  MINIPRESS  
  
 promethazine 25 mg tablet Commonly known as:  PHENERGAN  
  
 STOOL SOFTENER (DOCUSATE RADHA) 240 mg capsule Generic drug:  docusate calcium * traMADol 100 mg Tb24 Commonly known as:  ULTRAM-ER  
  
 * traMADol 50 mg tablet Commonly known as:  ULTRAM  
  
 TRANSDERM-SCOP 1 mg over 3 days Pt3d Generic drug:  scopolamine * Notice: This list has 4 medication(s) that are the same as other medications prescribed for you. Read the directions carefully, and ask your doctor or other care provider to review them with you. Follow-up Instructions Return in about 3 months (around 1/12/2019). To-Do List   
 10/17/2018 2:00 PM  
  Appointment with Ashish Alaniz at [address] [place] [address] ([phone]) 10/24/2018 2:00 PM  
  Appointment with Ashish Alaniz at [address] [place] [address] ([phone]) 10/31/2018 10:30 AM  
  Appointment with Ashish Alaniz at [facility] [address] [facility] ([phone]) Patient Instructions Abnormal Uterine Bleeding: Care Instructions Your Care Instructions Abnormal uterine bleeding (AUB) is irregular bleeding from the uterus that is longer or heavier than usual or does not occur at your regular time. Sometimes it is caused by changes in hormone levels. It can also be caused by growths in the uterus, such as fibroids or polyps. Sometimes a cause cannot be found. You may have heavy bleeding when you are not expecting your period. Your doctor may suggest a pregnancy test, if you think you are pregnant. Follow-up care is a key part of your treatment and safety. Be sure to make and go to all appointments, and call your doctor if you are having problems. It's also a good idea to know your test results and keep a list of the medicines you take. How can you care for yourself at home? · Be safe with medicines. Take pain medicines exactly as directed. ¨ If the doctor gave you a prescription medicine for pain, take it as prescribed. ¨ If you are not taking a prescription pain medicine, ask your doctor if you can take an over-the-counter medicine. · You may be low in iron because of blood loss. Eat a balanced diet that is high in iron and vitamin C. Foods rich in iron include red meat, shellfish, eggs, beans, and leafy green vegetables. Talk to your doctor about whether you need to take iron pills or a multivitamin. When should you call for help? Call 911 anytime you think you may need emergency care. For example, call if: 
  · You passed out (lost consciousness).  
 Call your doctor now or seek immediate medical care if: 
  · You have new or worse belly or pelvic pain.  
  · You have severe vaginal bleeding.  
  · You feel dizzy or lightheaded, or you feel like you may faint.  
 Watch closely for changes in your health, and be sure to contact your doctor if: 
  · You think you may be pregnant.  
  · Your bleeding gets worse.  
  · You do not get better as expected. Where can you learn more? Go to http://shayy-keira.info/. Enter M864 in the search box to learn more about \"Abnormal Uterine Bleeding: Care Instructions. \" Current as of: May 15, 2018 Content Version: 11.8 © 6660-0294 Healthwise, Incorporated. Care instructions adapted under license by [employer] (which disclaims liability or warranty for this information). If you have questions about a medical condition or this instruction, always ask your healthcare professional. [address] any warranty or liability for your use of this information. Introducing [facility]! [facility] introduces urturn patient portal. Now you can access parts of your medical record, email your doctor's office, and request medication refills online. 1. In your internet browser, go to [email or web address] 2. Click on the First Time User? Click Here link in the Sign In box. You will see the New Member Sign Up page. 3. Enter your urturn Access Code exactly as it appears below. You will not need to use this code after youve completed the sign-up process. If you do not sign up before the expiration date, you must request a new code. · urturn Access Code: [email or web address] Expires: 10/28/2018 12:53 PM 
 
4. Enter the last four digits of your Social Security Number (xxxx) and Date of Birth (mm/dd/yyyy) as indicated and click Submit. You will be taken to the next sign-up page. 5. Create a urturn ID. This will be your urturn login ID and cannot be changed, so think of one that is secure and easy to remember. 6. Create a urturn password. You can change your password at any time. 7. Enter your Password Reset Question and Answer. This can be used at a later time if you forget your password. 8. Enter your e-mail address. You will receive e-mail notification when new information is available in [address] 9. Click Sign Up. You can now view and download portions of your medical record. 10. Click the Download Summary menu link to download a portable copy of your medical information.  
 
If you have questions, please visit the Frequently Asked Questions section of the [employer]. Remember, [employer] is NOT to be used for urgent needs. For medical emergencies, dial 911. Now available from your iPhone and Android! Please provide this summary of care documentation to your next provider. Your primary care clinician is listed as NONE. If you have any questions after today's visit, please call [phone].

## 2018-10-12 NOTE — PROGRESS NOTES
Hematology/medical oncology progress note    10/12/2018  Tomas Li  YOB: 1985    Diagnosis: Chronic anemia due to blood loss, dysfunctional uterine bleeding    Ms. Mckenzie Arambula is a 65-year-old woman who has been complaining of abnormal uterine bleeding for several years. She also has occasional easy bruising. She was referred initially for evaluation of unexplained anemia to assess for any evidence that she may have an underlying/disorder. On 8/28/2018 the CBC revealed a WBC count of 7.4, hemoglobin was normal at 12.5 g/dL, hematocrit was 38.6%, and a platelet count was 813,552. Lab tests revealed that she had a normal factor VIII level of 67% with a von Willebrand factor antigen normal at 53%. Her von Willebrand activity was slightly decreased at 38% but this did not meet the criteria for diagnosis of von Willebrand disorder. Additionally she had a normal factor XIII activity. I have explained to the patient that her CBC showed all normal values and the preliminary tests for a potential coagulation abnormality have been normal or negative. I will reassess her again in about 3 months. She was advised to avoid the use of aspirin or NSAID-containing compounds. At the time of follow-up we will reassess her hemoglobin hematocrit and I have told her to keep a log of frequency and duration of any bruising on the extremities or torso so that we can decide on any additional advanced testing such as platelet aggregation assays or additional coagulation protein assessments. The patient had her questions answered to her satisfaction. Total time 30 minutes, greater than 50% of the time was in counseling and coordination of care. David Montoya MD, [address]

## 2018-10-17 ENCOUNTER — HOSPITAL ENCOUNTER (OUTPATIENT)
Dept: PHYSICAL THERAPY | Age: 23
Discharge: HOME | End: 2018-10-17
Payer: OTHER GOVERNMENT

## 2018-10-17 PROCEDURE — 97530 THERAPEUTIC ACTIVITIES: CPT

## 2018-10-17 PROCEDURE — 97110 THERAPEUTIC EXERCISES: CPT

## 2018-10-17 NOTE — PROGRESS NOTES
PF DAILY TREATMENT NOTE 3-16    Patient Name: Gustavo Thompson  Date:10/17/2018  : 1995  [x]  Patient  Verified  Payor:  / Plan: [employer] / Product Type:  /    In time: 1:59  Out time: 2:38  Total Treatment Time (min): 39  Visit #: 8 of 16    Treatment Area: [x] Pelvic Floor     [] Other:    SUBJECTIVE  Pain Level (0-10 scale): 0/10  Any medication changes, allergies to medications, adverse drug reactions, diagnosis change, or new procedure performed?: [x] No    [] Yes (see summary sheet for update)  Subjective functional status/changes:   [] No changes reported  \"I have a good day today, no pain just sleepy and tired. \"    OBJECTIVE       8 min Therapeutic Activity:  []  See flow sheet :    [x]  Increase Tissue extensibility        [x]  Assess fiber intake    [x]  Assess voiding habits                                          [x]  Assess bowel habits  [x]  Other: stretching/exercise frequency, relaxation  Rationale: increase ROM, increase strength, improve coordination, improve balance and increase proprioception  to improve the patients ability to improve defecation dynamics and tolerance to ADLs       31 min Therapeutic Exercise:  [x] See flow sheet :    []  Pelvic floor strengthening                 []  Pelvic floor downtraining  []  Quality pelvic floor contractions       []  Relaxation techniques  []  Urge suppression exercises  []  Other:  Rationale: increase ROM, increase strength, improve coordination, improve balance and increase proprioception  to improve the patients ability to perform ADLs with ease    With   [] TE   [] TA   [] neuro  [] manual   [] other: Patient Education: [x] Review HEP    [] Progressed/Changed HEP based on:   [] positioning   [] body mechanics   [] transfers   [] heat/ice application    [] other:      Other Objective/Functional Measures:   []baseline resting tone:   []slow twitch mms   []fast twitch mms    Pain Level (0-10 scale) post treatment: 1-2/10 soreness    ASSESSMENT/Changes in Function: Pt present with no pain, stating \"having a good day. \" Demonstrated poor tolerance to all therex, and poor body mechanics/posture. Pt's menses just cleared up; will resume internal assessment next visit. [x]  Decrease # of leaks [] No change [x]  Improving [] Resolved   [x]  Decrease hypertonus [] No change [x]  Improving [] Resolved   []  Increase void interval [] No change []  Improving [] Resolved   []  Increase PF strength [] No change []  Improving [] Resolved   []  Increase PF endurance [] No change []  Improving [] Resolved   []  Increase endurance [] No change []  Improving [] Resolved   []  Decrease # of pads [] No change []  Improving [] Resolved   [x]  Decrease pain [] No change [x]  Improving [] Resolved   []  Increased coordination [] No change []  Improving [] Resolved   []  Increased Bowel Frequency [] No change []  Improving [] Resolved       Patient will continue to benefit from skilled PT services to modify and progress therapeutic interventions, address functional mobility deficits, address ROM deficits, address strength deficits, analyze and address soft tissue restrictions, analyze and cue movement patterns, analyze and modify body mechanics/ergonomics, assess and modify postural abnormalities and instruct in home and community integration to attain remaining goals.      []  See Plan of Care  [x]  See progress note/recertification  []  See Discharge Summary      Progress towards goals / Updated goals:  Short Term Goals: To be accomplished in 4  weeks:  1. Patient will demonstrate home exercise program accurately as adjunct to PT clinic visits to promote healthy lifestyle and increase quality of life. Status @ Eval: initiated with abdominal massage  Current: added HEP for posture and initiated constipation management; good compliance with fiber intake 9-5-18      2.  Patient will report ability to utilize Dilator progressively with no pain to promote decrease of symptoms and increase quality of life. Status @ Eval: provide as indicated    Current: issue S+ sized dilator 8-29-18; unable to tolerate S+, decreased to S 9-19-18      3. Patient will report pain decreased to 2-3/3 on Marinoff scale for more normal function and increased quality of life. Status @ Eval: 3/3   Current: no change 9-5-18, no change 10-3-18, not trying 10-17-18      4. Patient will have decreased pain to 5-6/10 at most with sexual relations and gynecological exams. Status @ Eval: 10/10 at worst  Current: no change 9-5-18, mod pain 4-5/10 10-3-18      Long Term Goals: To be accomplished in 8  weeks:  1. Patient will have FOTO score for PFDI Pain decreased by 5-8% points indicating improvement in function and report of no difficulty with maintaining intimate relations due to pain. Status @ Eval: 78  Current: will be assessed at 5th visit 9-5-18, will be assessed next time due to clinician's error 10-3-18      2. Patient will be able to participate in sexual relations with no discomfort with 0-1/3 Marinoff score for increased quality of life and more normal function. Status @ Eval: 3/3   Current: no change 9-5-18, no change 10-3-18      3. Patient will have decreased pain to 3/10 at most with sexual relations and gynecological exams. Status @ Eval: 10/10 at worst  Current: no change 9-5-18, mod pain 4-5/10 10-3-18      4. Patient will report at least 50% improvement with stress UI to improve her QOL. Status @ Eval: couple leakage episodes within a month.   Current: no change 9-5-18; pt reports some improvement with leakage 9-26-18, no leakage since last visit 10-3-18; met 10-17-18      5. Patient will be independent with HEP at time of discharge to be able to continue with pelvic floor program.  Status @ Eval: initiated with abdominal massage   Current: good compliance with HEP and fiber intake 9-5-18      PLAN  [x]  Upgrade activities as tolerated     [x]  Continue plan of care  []  Update interventions per flow sheet       []  Discharge due to:_  []  Other:_      Celester Reusing, PT 10/17/2018  8:16 AM    Future Appointments   Date Time Provider Huyen Morris   10/17/2018  2:00 PM Matthew Null [id number] [facility]   10/24/2018  2:00 PM Matthew Null [id number] [facility]   10/31/2018 10:30 AM Matthew Null [id number] [facility]   1/18/2019  2:00 PM Emma Lopez MD [address] [facility]

## 2018-10-24 ENCOUNTER — HOSPITAL ENCOUNTER (OUTPATIENT)
Dept: PHYSICAL THERAPY | Age: 23
End: 2018-10-24
Payer: OTHER GOVERNMENT

## 2018-10-31 ENCOUNTER — HOSPITAL ENCOUNTER (OUTPATIENT)
Dept: PHYSICAL THERAPY | Age: 23
Discharge: HOME | End: 2018-10-31
Payer: OTHER GOVERNMENT

## 2018-10-31 PROCEDURE — 97110 THERAPEUTIC EXERCISES: CPT

## 2018-10-31 PROCEDURE — 97530 THERAPEUTIC ACTIVITIES: CPT

## 2018-10-31 NOTE — PROGRESS NOTES
[facility] [place]  [address] [facility]  [phone] fax    Physical Therapy Discharge Summary    Patient name: [facility] Start of Care: 2018   Referral source: Handy Aragon MD : 1995               Medical Diagnosis: Pelvic and perineal pain [R10.2]    Onset Date: chronic, about 10 years               Treatment Diagnosis: PFD, stress UI   Prior Hospitalization: see medical history Provider#: [id number]   Medications: Verified on Patient summary List    Comorbidities: , l/s compression fracture, Left hip avulsion fracture, depression, anxiety, PTSD, bordeline personality disorder, asthma   Prior Level of Function: less pain with all mobility, less leakage episodes    Visits from Start of Care: 9    Missed Visits: 1    Reporting Period : 2018 to 10-    Summary of Care:  Progress towards goals / Updated goals:  Short Term Goals: To be accomplished in 4  weeks:  1. Patient will demonstrate home exercise program accurately as adjunct to PT clinic visits to promote healthy lifestyle and increase quality of life. Status @ Eval: initiated with abdominal massage  Current: added HEP for posture and initiated constipation management; good compliance with fiber intake 18; good compliance 10-31-18  Status at discharge: met      2. Patient will report ability to utilize Dilator progressively with no pain to promote decrease of symptoms and increase quality of life. Status @ Eval: provide as indicated    Current: issue S+ sized dilator 18; unable to tolerate S+, decreased to S 18, good compliance 10-31-18  Status at discharge: met      3. Patient will report pain decreased to 2-3/3 on Marinoff scale for more normal function and increased quality of life. Status @ Eval: 3/3   Current: no change 18, no change 10-3-18  Status at discharge: not met    4.  Patient will have decreased pain to 5-6/10 at most with sexual relations and gynecological exams. Status @ Eval: 10/10 at worst  Current: no change 9-5-18, mod pain 4-5/10 10-3-18; fluctuating pain level 0-7/10 10-31-18  Status at discharge: met  [address] be accomplished in 8  weeks:  1. Patient will have FOTO score for PFDI Pain decreased by 5-8% points indicating improvement in function and report of no difficulty with maintaining intimate relations due to pain. Status @ Eval: 78  Current: will be assessed at 5th visit 9-5-18, will be assessed next time due to clinician's error 10-3-18, met decreased to 25 10-31-18  Status at discharge: met      2. Patient will be able to participate in sexual relations with no discomfort with 0-1/3 Marinoff score for increased quality of life and more normal function. Status @ Eval: 3/3   Current: no change 9-5-18, no change 10-3-18  Status at discharge: met      3. Patient will have decreased pain to 3/10 at most with sexual relations and gynecological exams. Status @ Eval: 10/10 at worst  Current: no change 9-5-18, mod pain 4-5/10 10-3-18, fluctuating pain level 0-7/10 10-31-18  Status at discharge: not met      4. Patient will report at least 50% improvement with stress UI to improve her QOL. Status @ Eval: couple leakage episodes within a month. Current: no change 9-5-18; pt reports some improvement with leakage 9-26-18, no leakage since last visit 10-3-18; met 10-17-18  Status at discharge: met      5. Patient will be independent with HEP at time of discharge to be able to continue with pelvic floor program.  Status @ Eval: initiated with abdominal massage \  Current: good compliance with HEP and fiber intake 9-5-18, met 10-31-18  Status at discharge: met      ASSESSMENT/RECOMMENDATIONS: Pt making slow progress then plateau for the last several weeks. Pt reported significant improving overall pain except no significabt change with pain during sexual activities.  She also reported good improvement with incontinence symptoms, no leakage since [facility]". Pt however is limited with fluctuating pain (with back), poor posture, poor core and hip strength. Pt also demonstrates elevated stress level and reports fluctuating bleeding/spotting. She is planning for hysterectomy; will resume PF PT as needed post surgery.      [x]Discontinue therapy: [x]Patient has reached or is progressing toward set goals      []Patient is non-compliant or has abdicated      []Due to lack of appreciable progress towards set goals    Cheyanne Elkins, PT 10/31/2018 1:13 PM

## 2018-10-31 NOTE — PROGRESS NOTES
PF DAILY TREATMENT NOTE 3-16    Patient Name: Stephanie Miller  Date:10/31/2018  : 1995  [x]  Patient  Verified  Payor: MARLA / Plan: Clayton Marie 73 / Product Type: Junior Rogelio /    In time: 10:31  Out time: 11:10  Total Treatment Time (min): 39  Visit #: 9 of 16    Treatment Area: [x] Pelvic Floor     [] Other:     SUBJECTIVE  Pain Level (0-10 scale): 0/10  Any medication changes, allergies to medications, adverse drug reactions, diagnosis change, or new procedure performed?: [x] No    [] Yes (see summary sheet for update)  Subjective functional status/changes:   [x] No changes reported  \"I'm tired; I don't want to get out of bed today.     OBJECTIVE     24 min Therapeutic Activity:  []  See flow sheet :    [x]  Increase Tissue extensibility        [x]  Assess fiber intake    [x]  Assess voiding habits                                          [x]  Assess bowel habits  [x]  Other: reviewed stretching/exercise frequency, relaxation, progressing therex for core, hips, and PFM  Rationale: increase ROM, increase strength, improve coordination, improve balance and increase proprioception  to improve the patients ability to improve defecation dynamics and tolerance to ADLs        15 min Therapeutic Exercise:  [x] See flow sheet :    []  Pelvic floor strengthening                 []  Pelvic floor downtraining  []  Quality pelvic floor contractions       []  Relaxation techniques  []  Urge suppression exercises  []  Other:  Rationale: increase ROM, increase strength, improve coordination, improve balance and increase proprioception  to improve the patients ability to perform ADLs with ease     With   [] TE   [] TA   [] neuro  [] manual   [] other: Patient Education: [x] Review HEP    [] Progressed/Changed HEP based on:   [] positioning   [] body mechanics   [] transfers   [] heat/ice application    [] other:       Other Objective/Functional Measures:   []baseline resting tone:   []slow twitch mms   []fast twitch mms    FOTO: 25    Pain Level (0-10 scale) post treatment: 0/10     ASSESSMENT/Changes in Function: See Discharge Summary    [x]  Decrease # of leaks [] No change [x]  Improving [] Resolved   [x]  Decrease hypertonus [] No change [x]  Improving [] Resolved   []  Increase void interval [] No change []  Improving [] Resolved   []  Increase PF strength [] No change []  Improving [] Resolved   []  Increase PF endurance [] No change []  Improving [] Resolved   []  Increase endurance [] No change []  Improving [] Resolved   []  Decrease # of pads [] No change []  Improving [] Resolved   [x]  Decrease pain [] No change [x]  Improving [] Resolved   []  Increased coordination [] No change []  Improving [] Resolved   []  Increased Bowel Frequency [] No change []  Improving [] Resolved           []  See Plan of Care  []  See progress note/recertification  [x]  See Discharge Summary      Progress towards goals / Updated goals:  Short Term Goals: To be accomplished in 4  weeks:  1. Patient will demonstrate home exercise program accurately as adjunct to PT clinic visits to promote healthy lifestyle and increase quality of life. Status @ Eval: initiated with abdominal massage  Current: added HEP for posture and initiated constipation management; good compliance with fiber intake 9-5-18; good compliance 10-31-18      2. Patient will report ability to utilize Dilator progressively with no pain to promote decrease of symptoms and increase quality of life. Status @ Eval: provide as indicated    Current: issue S+ sized dilator 8-29-18; unable to tolerate S+, decreased to S 9-19-18, good compliance 10-31-18      3. Patient will report pain decreased to 2-3/3 on Marinoff scale for more normal function and increased quality of life. Status @ Eval: 3/3   Current: no change 9-5-18, no change 10-3-18      4. Patient will have decreased pain to 5-6/10 at most with sexual relations and gynecological exams.   Status @ Eval: 10/10 at worst  Current: no change 9-5-18, mod pain 4-5/10 10-3-18; fluctuating pain level 0-7/10 10-31-18      Long Term Goals: To be accomplished in 8  weeks:  1. Patient will have FOTO score for PFDI Pain decreased by 5-8% points indicating improvement in function and report of no difficulty with maintaining intimate relations due to pain. Status @ Eval: 78  Current: will be assessed at 5th visit 9-5-18, will be assessed next time due to clinician's error 10-3-18, met decreased to 25 10-31-18      2. Patient will be able to participate in sexual relations with no discomfort with 0-1/3 Marinoff score for increased quality of life and more normal function. Status @ Eval: 3/3   Current: no change 9-5-18, no change 10-3-18      3. Patient will have decreased pain to 3/10 at most with sexual relations and gynecological exams. Status @ Eval: 10/10 at worst  Current: no change 9-5-18, mod pain 4-5/10 10-3-18, fluctuating pain level 0-7/10 10-31-18      4. Patient will report at least 50% improvement with stress UI to improve her QOL. Status @ Eval: couple leakage episodes within a month.   Current: no change 9-5-18; pt reports some improvement with leakage 9-26-18, no leakage since last visit 10-3-18; met 10-17-18      5. Patient will be independent with HEP at time of discharge to be able to continue with pelvic floor program.  Status @ Eval: initiated with abdominal massage   Current: good compliance with HEP and fiber intake 9-5-18, met 10-31-18      PLAN  []  Upgrade activities as tolerated     []  Continue plan of care  []  Update interventions per flow sheet       [x]  Discharge due to:_ poor progression, planning to have surgery this year  []  Other:_              Sukhdev Cali 10/31/2018  8:13 AM    Future Appointments   Date Time Provider Huyen Morris   10/31/2018 10:30 AM [facility]   1/18/2019  2:00 PM Galen Groves MD [address] [facility]

## 2019-01-18 ENCOUNTER — HOSPITAL ENCOUNTER (OUTPATIENT)
Dept: ONCOLOGY | Age: 24
Discharge: HOME | End: 2019-01-18

## 2019-01-18 ENCOUNTER — HOSPITAL ENCOUNTER (OUTPATIENT)
Dept: LAB | Age: 24
Discharge: HOME | End: 2019-01-18
Payer: OTHER GOVERNMENT

## 2019-01-18 ENCOUNTER — OFFICE VISIT (OUTPATIENT)
Dept: ONCOLOGY | Age: 24
End: 2019-01-18

## 2019-01-18 VITALS
WEIGHT: 160 LBS | RESPIRATION RATE: 16 BRPM | OXYGEN SATURATION: 100 % | SYSTOLIC BLOOD PRESSURE: 95 MMHG | DIASTOLIC BLOOD PRESSURE: 63 MMHG | BODY MASS INDEX: 26.66 KG/M2 | HEART RATE: 86 BPM | TEMPERATURE: 97.8 F | HEIGHT: 65 IN

## 2019-01-18 DIAGNOSIS — D50.0 CHRONIC BLOOD LOSS ANEMIA: ICD-10-CM

## 2019-01-18 DIAGNOSIS — D50.0 CHRONIC BLOOD LOSS ANEMIA: Primary | ICD-10-CM

## 2019-01-18 DIAGNOSIS — N93.8 DYSFUNCTIONAL UTERINE BLEEDING: ICD-10-CM

## 2019-01-18 LAB
ALBUMIN SERPL-MCNC: 3.6 G/DL (ref 3.4–5)
ALBUMIN/GLOB SERPL: 1 {RATIO} (ref 0.8–1.7)
ALP SERPL-CCNC: 68 U/L (ref 45–117)
ALT SERPL-CCNC: 35 U/L (ref 13–56)
ANION GAP SERPL CALC-SCNC: 5 MMOL/L (ref 3–18)
AST SERPL-CCNC: 20 U/L (ref 15–37)
BASO+EOS+MONOS # BLD AUTO: 0.6 K/UL (ref 0–2.3)
BASO+EOS+MONOS NFR BLD AUTO: 7 % (ref 0.1–17)
BILIRUB SERPL-MCNC: 0.2 MG/DL (ref 0.2–1)
BUN SERPL-MCNC: 11 MG/DL (ref 7–18)
BUN/CREAT SERPL: 11 (ref 12–20)
CALCIUM SERPL-MCNC: 8.4 MG/DL (ref 8.5–10.1)
CHLORIDE SERPL-SCNC: 105 MMOL/L (ref 100–108)
CO2 SERPL-SCNC: 29 MMOL/L (ref 21–32)
CREAT SERPL-MCNC: 0.97 MG/DL (ref 0.6–1.3)
DIFFERENTIAL METHOD BLD: NORMAL
ERYTHROCYTE [DISTWIDTH] IN BLOOD BY AUTOMATED COUNT: 11.9 % (ref 11.5–14.5)
FERRITIN SERPL-MCNC: 47 NG/ML (ref 8–388)
GLOBULIN SER CALC-MCNC: 3.6 G/DL (ref 2–4)
GLUCOSE SERPL-MCNC: 68 MG/DL (ref 74–99)
HCT VFR BLD AUTO: 37.3 % (ref 36–48)
HGB BLD-MCNC: 12.2 G/DL (ref 12–16)
IRON SATN MFR SERPL: 20 %
IRON SERPL-MCNC: 62 UG/DL (ref 50–175)
LYMPHOCYTES # BLD: 3.1 K/UL (ref 1.1–5.9)
LYMPHOCYTES NFR BLD: 35 % (ref 14–44)
MCH RBC QN AUTO: 28 PG (ref 25–35)
MCHC RBC AUTO-ENTMCNC: 32.7 G/DL (ref 31–37)
MCV RBC AUTO: 85.6 FL (ref 78–102)
NEUTS SEG # BLD: 5.1 K/UL (ref 1.8–9.5)
NEUTS SEG NFR BLD: 58 % (ref 40–70)
PLATELET # BLD AUTO: 274 K/UL (ref 140–440)
POTASSIUM SERPL-SCNC: 3.8 MMOL/L (ref 3.5–5.5)
PROT SERPL-MCNC: 7.2 G/DL (ref 6.4–8.2)
RBC # BLD AUTO: 4.36 M/UL (ref 4.1–5.1)
SODIUM SERPL-SCNC: 139 MMOL/L (ref 136–145)
TIBC SERPL-MCNC: 312 UG/DL (ref 250–450)
WBC # BLD AUTO: 8.8 K/UL (ref 4.5–13)

## 2019-01-18 PROCEDURE — 80053 COMPREHEN METABOLIC PANEL: CPT

## 2019-01-18 PROCEDURE — 36415 COLL VENOUS BLD VENIPUNCTURE: CPT

## 2019-01-18 PROCEDURE — 83540 ASSAY OF IRON: CPT

## 2019-01-18 PROCEDURE — 82728 ASSAY OF FERRITIN: CPT

## 2019-01-18 NOTE — PROGRESS NOTES
Hematology/Oncology  Progress Note Name: Donis Alex Date: 2019 : 1995 None Ms. Qi Hurst is a 24 y. o. woman has a history of anemia due to chronic blood loss from dysfunctional uterine bleeding. Current therapy: Iron therapy as needed, the patient recently underwent a hysterectomy. Subjective:  
 
Ms. Armida Uribe is a 25-year-old woman with a history of severe chronic anemia due to dysfunctional uterine bleeding. On 2019 she underwent a hysterectomy due to her unexplained and continuous bleeding. Currently she is still recovering from surgery. She continues to use pain medications in the form of Dilaudid and she takes MiraLAX for constipation. Fortunately her CBC today shows that her hemoglobin and hematocrit are remaining. Past medical history, family history, and social history: these were reviewed and remains unchanged. Past Medical History:  
Diagnosis Date  Anxiety  Appetite loss  Asthma  Back pain  Borderline personality disorder ([address]  Depression  Falling hair  Muscle pain  PTSD (post-traumatic stress disorder)  Trouble in sleeping Past Surgical History:  
Procedure Laterality Date  DELIVERY  Social History Socioeconomic History  Marital status: UNKNOWN Spouse name: Not on file  Number of children: Not on file  Years of education: Not on file  Highest education level: Not on file Social Needs  Financial resource strain: Not on file  Food insecurity - worry: Not on file  Food insecurity - inability: Not on file  Transportation needs - medical: Not on file  Transportation needs - non-medical: Not on file Occupational History  Not on file Tobacco Use  Smoking status: Never Smoker  Smokeless tobacco: Never Used Substance and Sexual Activity  Alcohol use: No  
  Comment: quit 2016  Drug use: No  
 Sexual activity: Yes  
  Partners: Male Other Topics Concern  Not on file Social History Narrative  Not on file Family History Problem Relation Age of Onset  Diabetes Maternal Grandmother  Diabetes Maternal Grandfather  Stroke Maternal Grandfather  Hypertension Maternal Grandfather  Diabetes Paternal Grandmother  Diabetes Paternal Grandfather Current Outpatient Medications Medication Sig Dispense Refill  acetaminophen (TYLENOL) 325 mg tablet  TYLENOL EXTRA STRENGTH 500 mg tablet  ARIPiprazole (ABILIFY) 5 mg tablet  chlorzoxazone (PARAFON FORTE) 500 mg tablet  clonazePAM (KLONOPIN) 0.5 mg tablet  DEPAKOTE 500 mg tablet  STOOL SOFTENER, DOCUSATE RADHA, 240 mg capsule  doxycycline (VIBRAMYCIN) 100 mg capsule  ibuprofen (MOTRIN) 800 mg tablet  JOLESSA 0.15 mg-30 mcg 3MPk  lidocaine (LIDODERM) 5 %  lithium carbonate 300 mg capsule  metroNIDAZOLE (FLAGYL) 500 mg tablet  naproxen (NAPROSYN) 500 mg tablet  ondansetron (ZOFRAN ODT) 4 mg disintegrating tablet  oxyCODONE IR (ROXICODONE) 5 mg immediate release tablet  pantoprazole (PROTONIX) 40 mg tablet  prazosin (MINIPRESS) 1 mg capsule  promethazine (PHENERGAN) 25 mg tablet  TRANSDERM-SCOP 1 mg over 3 days pt3d     
 traMADol (ULTRAM) 50 mg tablet  traMADol (ULTRAM-ER) 100 mg Tb24 Review of Systems Constitutional: The patient has no acute distress or discomfort. HEENT: The patient denies recent head trauma, eye pain, blurred vision,  hearing deficit, oropharyngeal mucosal pain or lesions, and the patient denies throat pain or discomfort. Lymphatics: The patient denies palpable peripheral lymphadenopathy. Hematologic: The patient denies having bruising, bleeding, or progressive fatigue. Respiratory: Patient denies having shortness of breath, cough, sputum production, fever, or dyspnea on exertion. Cardiovascular: The patient denies having leg pain, leg swelling, heart palpitations, chest permit, chest pain, or lightheadedness. The patient denies having dyspnea on exertion. Gastrointestinal: The patient denies having nausea, emesis, or diarrhea. The patient denies having any hematemesis or blood in the stool. Genitourinary: Patient denies having urinary urgency, frequency, or dysuria. The patient denies having blood in the urine. Psychological: The patient denies having symptoms of nervousness, anxiety, depression, or thoughts of harming self. Skin: Patient denies having skin rashes, skin, ulcerations, or unexplained itching or pruritus. Musculoskeletal: The patient denies having pain in the joints or bones. Objective:  
 
Visit Vitals BP 95/63 Pulse 86 Temp 97.8 °F (36.6 °C) (Oral) Resp 16 Ht 5' 5\" (1.651 m) Wt 72.6 kg (160 lb) SpO2 100% BMI 26.63 kg/m² ECOG PS=0 Physical Exam:  
Gen. Appearance: The patient is in no acute distress. Skin: There is no bruise or rash. HEENT: The exam is unremarkable. Neck: Supple without lymphadenopathy or thyromegaly. Lungs: Clear to auscultation and percussion; there are no wheezes or rhonchi. Heart: Regular rate and rhythm; there are no murmurs, gallops, or rubs. Abdomen: Bowel sounds are present and normal.  There is no guarding, tenderness, or hepatosplenomegaly. Extremities: There is no clubbing, cyanosis, or edema. Neurologic: There are no focal neurologic deficits. Lymphatics: There is no palpable peripheral lymphadenopathy. Musculoskeletal: The patient has full range of motion at all joints. There is no evidence of joint deformity or effusions. There is no focal joint tenderness. Psychological/psychiatric: There is no clinical evidence of anxiety, depression, or melancholy. Lab data: 
   
Results for orders placed or performed during the hospital encounter of 01/18/19 CBC WITH 3 PART DIFF     Status: None Result Value Ref Range Status WBC 8.8 4.5 - 13.0 K/uL Final  
 RBC 4.36 4.10 - 5.10 M/uL Final  
 HGB 12.2 12.0 - 16.0 g/dL Final  
 HCT 37.3 36 - 48 % Final  
 MCV 85.6 78 - 102 FL Final  
 MCH 28.0 25.0 - 35.0 PG Final  
 MCHC 32.7 31 - 37 g/dL Final  
 RDW 11.9 11.5 - 14.5 % Final  
 PLATELET 994 959 - 764 K/uL Final  
 NEUTROPHILS 58 40 - 70 % Final  
 MIXED CELLS 7 0.1 - 17 % Final  
 LYMPHOCYTES 35 14 - 44 % Final  
 ABS. NEUTROPHILS 5.1 1.8 - 9.5 K/UL Final  
 ABS. MIXED CELLS 0.6 0.0 - 2.3 K/uL Final  
 ABS. LYMPHOCYTES 3.1 1.1 - 5.9 K/UL Final  
  Comment: Test performed at [place] [id number] Location. Results Reviewed by Medical Director. DF AUTOMATED   Final  
 
 
   
Assessment: 1. Chronic blood loss anemia 2. Dysfunctional uterine bleeding Plan:  
Chronic blood loss anemia: The patient has a CBC from today showing that her blood count is stable with a WBC count of 8.8, hemoglobin 12.2 g/dL, hematocrit 37.3%, and the platelet count is 575,735. I will recheck iron profile and ferritin levels at this time. Currently she is doing much better since the hysterectomy. Dysfunctional uterine bleeding: The patient underwent a hysterectomy on 1/4/2019 due to her chronic ongoing blood loss. She is continuing to recover from surgery. Her primary pain medication is Dilaudid. She is using MiraLAX for constipation. Follow-up in 3 months Orders Placed This Encounter  COMPLETE CBC & AUTO DIFF WBC  InHouse CBC ([place]) Standing Status:   Future Number of Occurrences:   1 Standing Expiration Date:   1/25/2019  METABOLIC PANEL, COMPREHENSIVE Standing Status:   Future Standing Expiration Date:   1/19/2020  
 IRON PROFILE Standing Status:   Future Standing Expiration Date:   1/19/2020  FERRITIN Standing Status:   Future Standing Expiration Date:   1/19/2020 Kaitlyn Pettit MD 
1/18/2019 Please note: This document has been produced using voice recognition software. Unrecognized errors in transcription may be present.

## 2019-05-07 ENCOUNTER — HOSPITAL ENCOUNTER (OUTPATIENT)
Dept: PHYSICAL THERAPY | Age: 24
Discharge: HOME | End: 2019-05-07
Payer: OTHER GOVERNMENT

## 2019-05-07 PROCEDURE — 97530 THERAPEUTIC ACTIVITIES: CPT

## 2019-05-07 PROCEDURE — 97161 PT EVAL LOW COMPLEX 20 MIN: CPT

## 2019-05-07 NOTE — PROGRESS NOTES
[facility] [place] [employer] [facility]  [address] [facility]  [phone] fax    Plan of Care/ Statement of Necessity for Physical Therapy Services    Patient name: [facility] Start of Care: 2019   Referral source: Leo Miramontes MD : 1995    Medical Diagnosis: Chronic female pelvic pain [R10.2, G89.29]  History of hysterectomy [Z90.710]  Payor:  / Plan: Burtis Nipple / Product Type: Branden Mon /  Onset Date: 2019    Treatment Diagnosis: PFD, stress UI   Prior Hospitalization: see medical history Provider#: [id number]   Medications: Verified on Patient summary List    Comorbidities: , hysterectomy, depression, PTSD, anxiety, BPD, asthma   Prior Level of Function: fluctuating with pain, less leakage, mod Ind with all mobility     The Plan of Care and following information is based on the information from the initial evaluation. Assessment/ key information: Ms [facility] is a 23 y/o F who present with c/o PFD and stress UI. Pt was unable to cont PT last year due to poor progression; pt had hysterectomy (ovaries were left intact) in 19. Pt reports good improvement with leakage after PT but worsening leakage frequency and pain after surgery. Pt has been getting routine injection for PF pain, and pt reports good relief with the injection and external compound pain relief cream. Pain llocates mostly inferior to umbilicus and in pelvic region. Pt reports severe pain with sexual relations; last attempt was in [place] is rated 3/3 on Marinoff scale. Pt notices sharp pain after 10-15 min of walking/running/playing with her son. Pt reports mild leakage with sneezing, coughing and lifting her son; occurs about 3-4 days a week. Nocturia 1x/night; pt however needs to drink more water before bed time as she takes her medication. Pt denies any urgency or bowel problem.  Pt reports 1 childbirth in ; pt had emergency  after 20 hours labor. Internal assessment reveals severe tone and tightness of PF muscles, max pain with palpation along layer 3 and B OI. Pt demonstrates very poor strength of PF muscles, 1-/5 with min spasm after 1 repetitions with 2 second holding. Pt reports soreness and tightness along  scar. Pattient may benefit from physical therapy to address PFD and stress UI symptoms to improve her QOL. Evaluation Complexity History HIGH Complexity :3+ comorbidities / personal factors will impact the outcome/ POC ; Examination MEDIUM Complexity : 3 Standardized tests and measures addressing body structure, function, activity limitation and / or participation in recreation  ;Presentation LOW Complexity : Stable, uncomplicated  ;Clinical Decision Making MEDIUM Complexity : FOTO score of 26-74  Overall Complexity Rating: LOW     Problem List: Pelvic pain/dysfunction, Decreased pelvic floor mm awareness, Decreased pelvic floor mm strength, Use of accessory muscles, Improper voiding habits, Hypertonus of pelvic floor and Urinary urgency    Treatment Plan may include any combination of the following:   Therapeutic exercise, Urge suppression techniques, Neuromuscular re-education, Manual therapy, Physical agent/modality and Patient education  Patient / Family readiness to learn indicated by: asking questions, trying to perform skills and interest    Persons(s) to be included in education: patient (P)    Barriers to Learning/Limitations: None    Patient Goal (s): help with pain, especially during sexual relations    Patient Self Reported Health Status: good    Rehabilitation Potential: fair    Short Term Goals: To be accomplished in 4  weeks:  1. Patient will demonstrate home exercise program accurately as adjunct to PT clinic visits to promote healthy lifestyle and increase quality of life. Status @ Eval: initiated with relaxation    2.  Patient will report ability to utilize [place] and Dilator progressively with no pain to promote decrease of symptoms and increase quality of life. Status @ Eval: provide as indicated      3. Patient will report pain decreased to 2/3 on Marinoff scale for more normal function and increased quality of life. Status @ Eval: 3/3      Long Term Goals: To be accomplished in 8  weeks:  1. Patient will have FOTO score for PFDI Pain decreased by 10-20% points indicating improvement in function and report of no difficulty with maintaining intimate relations due to pain. Status @ Eval: 63    2. Patient will be able to participate in sexual relations with no discomfort with 0-1/3 Marinoff score for increased quality of life and more normal function. Status @ Eval: 3/3     3. Patient will report at least 50% improvement with urinary leakage to improve her QOL  Status @ Eval: mild leakage occurs 3-4 days a week    4. Patient will be independent with HEP at time of discharge to be able to continue with pelvic floor program.  Status @ Eval: initiated with relaxation     Frequency / Duration: Patient to be seen 1 times per week for 16 weeks. Patient/ Caregiver education and instruction: Diagnosis, prognosis, Proper Voiding Habits, Diet, Pain Management, Exercises and Bladder Retraining    Sukhdev Conte, PT 5/7/2019 3:51 PM    ________________________________________________________________________    I certify that the above Therapy Services are being furnished while the patient is under my care. I agree with the treatment plan and certify that this therapy is necessary.     Physician's Signature:____________Date:_________TIME:________    ** Signature, Date and Time must be completed for valid certification **      Please sign and return to [facility]  [place]   [address] [place] [address]  [phone] fax

## 2019-05-07 NOTE — PROGRESS NOTES
PF Daily Treatment Note  Patient Name: Jonathan Jo  Date:2019  []  Patient  Verified  Insurance:Payor:  / Plan: Clayton Marie 73 / Product Type:  /   In time: 3:03  Out time:3:39  Total Treatment Time (min): 36  Total Timed Codes (min): 15  1:1 Treatment Time (MC only): 36   Visit #: 1 of -16    Treatment Area: [x] Pelvic Floor     [] Other:  SUBJECTIVE  Pain Level (0-10 scale): 3/10  Any medication changes, allergies to medications, adverse drug reactions, diagnosis change, or new procedure performed?: [x] No    [] Yes (see summary sheet for update)     Ms Jonathan Jo is a 23 y/o F who present with c/o PFD and stress UI. Pt was unable to cont PT last year due to poor progression; pt had hysterectomy (ovaries were left intact) in 19. Pt reports good improvement with leakage after PT but worsening leakage frequency and pain after surgery. Pt has been getting routine injection for PF pain, and pt reports good relief with the injection and external compound pain relief cream. Pain llocates mostly inferior to umbilicus and in pelvic region. Pt reports severe pain with sexual relations; last attempt was in [place]. Pain is rated 3/3 on Marinoff scale. Pt notices sharp pain after 10-15 min of walking/running/playing with her son. Pt reports mild leakage with sneezing, coughing and lifting her son; occurs about 3-4 days a week. Nocturia 1x/night; pt however needs to drink more water before bed time as she takes her medication. Pt denies any urgency or bowel problem. Pt reports 1 childbirth in 2016; pt had emergency  after 20 hours labor.      Pelvic Floor Dysfunction Evaluation    Musculoskeletal Screen:    Skin Integrity:  [x] Healthy [] Red  [] Labia Atrophy [] Fragile    Sensation: [x] Intact [] Diminished:    Muscle Bulk: [x] Symmetrical  [] Well-developed [] Atrophied:  []L   []R   []B    Prolapse: none [] Cystocele:   [] Rectocele:    PERF Score (Performance/Endurance/Repetitions/Flicks)   P: 1- E: R: F: Total:    Patient has failed previous pelvic floor muscle training? [] Yes    [] No    EMG Evaluation:  [] N/A [] Deferred secondary to:    Channel A: Electrode type:  [] Internal    [] Surface    [] Vaginal    [] Rectal  Channel B: Electrode location:    Baseline Resting Tone (1 minute)  Channel A (microvolts): Quality:  [] Normal [] Irradic [] Elevated  Channel B (microvolts): Slow Twitch: (10 second hold, 20 second rest)  Channel A (microvolts): Quality:[] Quick/slow rise [] Low net rise  Net rise (microvolts):   [] Slow Relaxation [] Incoordination       [] Unable to contract [] Fatigues at (sec):       [] Elevated baseline between contractions    Channel B (microvolts): Use of Accessory Muscles: [] Minimal  Net rise (microvolts):   [] Moderate  [] Excessive       [] No use of accessory muscles    Fast Twitch (3 second hold, 10 second rest)  Channel A (microvolts): Quality:[] Quick/slow rise [] Low net rise  Net rise (microvolts):   [] Slow Relaxation [] Incoordination       [] Unable to contract [] Fatigues at (sec):       [] Elevated baseline between contractions    Channel B (microvolts):  Use of Accessory Muscles: [] Minimal  Net rise (microvolts):   [] Moderate  [] Excessive       [] No use of accessory muscles    Optional Tests:  Lower abdominal strength: /5    Comments/Additional Tests:        OBJECTIVE    21 min Evaluation    15 min Therapeutic Activity:  []  See flow sheet :reviewed healthy bladder tips, relaxed voiding, optimal digestion and position for bowel movement; HEP     Rationale: increase ROM, increase strength, improve coordination, improve balance and increase proprioception  to improve the patients ability to perform ADLs with ease and safety            min Patient Education: [x] Review HEP    [] Progressed/Changed HEP based on:   [] positioning   [] body mechanics   [] transfers   [] heat/ice application        Other Objective/Functional Measures:   []baseline resting tone:   []slow twitch mms   []fast twitch mms    Pain Level (0-10 scale) post treatment: 3/10    ASSESSMENT/Changes in Function: seen  POC please    []  Decrease # of leaks   [] No change []  Improving [] Resolved     []  Decrease hypertonus [] No change []  Improving [] Resolved     []  Increase void interval [] No change []  Improving [] Resolved     []  Increase PF strength [] No change []  Improving [] Resolved     []  Increase PF endurance [] No change []  Improving [] Resolved     []  Increase endurance [] No change []  Improving [] Resolved     []  Decrease # of pads [] No change []  Improving [] Resolved     []  Decrease pain [] No change []  Improving [] Resolved       Patient will continue to benefit from skilled PT services to modify and progress therapeutic interventions, address functional mobility deficits, address ROM deficits, address strength deficits, analyze and address soft tissue restrictions, analyze and cue movement patterns, analyze and modify body mechanics/ergonomics, assess and modify postural abnormalities, address imbalance/dizziness and instruct in home and community integration to attain remaining goals.      [x]  See Plan of Care         PLAN  []  Upgrade activities as tolerated     []  Continue plan of care  []  Update interventions per flow sheet       []  Discharge due to:_  []  Other:_      Ole Boyd, PT 5/7/2019  3:05 PM

## 2019-05-24 ENCOUNTER — APPOINTMENT (OUTPATIENT)
Dept: PHYSICAL THERAPY | Age: 24
End: 2019-05-24
Payer: OTHER GOVERNMENT

## 2019-05-31 ENCOUNTER — HOSPITAL ENCOUNTER (OUTPATIENT)
Dept: LAB | Age: 24
Discharge: HOME | End: 2019-05-31
Payer: OTHER GOVERNMENT

## 2019-05-31 ENCOUNTER — OFFICE VISIT (OUTPATIENT)
Dept: ONCOLOGY | Age: 24
End: 2019-05-31

## 2019-05-31 ENCOUNTER — HOSPITAL ENCOUNTER (OUTPATIENT)
Dept: ONCOLOGY | Age: 24
Discharge: HOME | End: 2019-05-31

## 2019-05-31 VITALS
RESPIRATION RATE: 16 BRPM | OXYGEN SATURATION: 100 % | WEIGHT: 180 LBS | TEMPERATURE: 97.7 F | HEART RATE: 76 BPM | SYSTOLIC BLOOD PRESSURE: 97 MMHG | DIASTOLIC BLOOD PRESSURE: 53 MMHG | HEIGHT: 65 IN | BODY MASS INDEX: 29.99 KG/M2

## 2019-05-31 DIAGNOSIS — D50.0 CHRONIC BLOOD LOSS ANEMIA: ICD-10-CM

## 2019-05-31 DIAGNOSIS — D50.0 CHRONIC BLOOD LOSS ANEMIA: Primary | ICD-10-CM

## 2019-05-31 DIAGNOSIS — N93.8 DYSFUNCTIONAL UTERINE BLEEDING: ICD-10-CM

## 2019-05-31 LAB
ALBUMIN SERPL-MCNC: 3.9 G/DL (ref 3.4–5)
ALBUMIN/GLOB SERPL: 1.1 {RATIO} (ref 0.8–1.7)
ALP SERPL-CCNC: 71 U/L (ref 45–117)
ALT SERPL-CCNC: 17 U/L (ref 13–56)
ANION GAP SERPL CALC-SCNC: 6 MMOL/L (ref 3–18)
AST SERPL-CCNC: 16 U/L (ref 15–37)
BASO+EOS+MONOS # BLD AUTO: 0.6 K/UL (ref 0–2.3)
BASO+EOS+MONOS NFR BLD AUTO: 8 % (ref 0.1–17)
BILIRUB SERPL-MCNC: 0.4 MG/DL (ref 0.2–1)
BUN SERPL-MCNC: 9 MG/DL (ref 7–18)
BUN/CREAT SERPL: 13 (ref 12–20)
CALCIUM SERPL-MCNC: 8.5 MG/DL (ref 8.5–10.1)
CHLORIDE SERPL-SCNC: 105 MMOL/L (ref 100–108)
CO2 SERPL-SCNC: 28 MMOL/L (ref 21–32)
CREAT SERPL-MCNC: 0.67 MG/DL (ref 0.6–1.3)
DIFFERENTIAL METHOD BLD: NORMAL
ERYTHROCYTE [DISTWIDTH] IN BLOOD BY AUTOMATED COUNT: 12.2 % (ref 11.5–14.5)
FERRITIN SERPL-MCNC: 35 NG/ML (ref 8–388)
GLOBULIN SER CALC-MCNC: 3.6 G/DL (ref 2–4)
GLUCOSE SERPL-MCNC: 81 MG/DL (ref 74–99)
HCT VFR BLD AUTO: 40.6 % (ref 36–48)
HGB BLD-MCNC: 13.7 G/DL (ref 12–16)
IRON SATN MFR SERPL: 41 %
IRON SERPL-MCNC: 133 UG/DL (ref 50–175)
LYMPHOCYTES # BLD: 2.8 K/UL (ref 1.1–5.9)
LYMPHOCYTES NFR BLD: 39 % (ref 14–44)
MCH RBC QN AUTO: 29.9 PG (ref 25–35)
MCHC RBC AUTO-ENTMCNC: 33.7 G/DL (ref 31–37)
MCV RBC AUTO: 88.6 FL (ref 78–102)
NEUTS SEG # BLD: 3.8 K/UL (ref 1.8–9.5)
NEUTS SEG NFR BLD: 53 % (ref 40–70)
PLATELET # BLD AUTO: 197 K/UL (ref 140–440)
POTASSIUM SERPL-SCNC: 4 MMOL/L (ref 3.5–5.5)
PROT SERPL-MCNC: 7.5 G/DL (ref 6.4–8.2)
RBC # BLD AUTO: 4.58 M/UL (ref 4.1–5.1)
SODIUM SERPL-SCNC: 139 MMOL/L (ref 136–145)
TIBC SERPL-MCNC: 323 UG/DL (ref 250–450)
WBC # BLD AUTO: 7.2 K/UL (ref 4.5–13)

## 2019-05-31 PROCEDURE — 83540 ASSAY OF IRON: CPT

## 2019-05-31 PROCEDURE — 36415 COLL VENOUS BLD VENIPUNCTURE: CPT

## 2019-05-31 PROCEDURE — 80053 COMPREHEN METABOLIC PANEL: CPT

## 2019-05-31 PROCEDURE — 82728 ASSAY OF FERRITIN: CPT

## 2019-05-31 NOTE — PROGRESS NOTES
Hematology/Oncology  Progress Note    Name: Lei Loomis  Date: 2019  : 1995    None     Ms. Santy Min is a 24 y. o. woman has a history of anemia due to chronic blood loss from dysfunctional uterine bleeding. Current therapy: Iron therapy as needed, the patient recently underwent hysterectomy. Subjective:     Ms. Víctor Morales is a 79-year-old woman with a history of severe chronic anemia due to dysfunctional uterine bleeding. She states on 2019 she underwent hysterectomy due to her unexplained and continuous bleeding. Se reports she totally recovered from the surgery. She denies pain or any discomfort. She denies fatigue, shortness of breath, and dizziness. She does not have any concerns or complaints to report at this time. Past medical history, family history, and social history: these were reviewed and remains unchanged.     Past Medical History:   Diagnosis Date    Anxiety     Appetite loss     Asthma     Back pain     Borderline personality disorder (HCC)     Depression     Falling hair     Muscle pain     PTSD (post-traumatic stress disorder)     Trouble in sleeping      Past Surgical History:   Procedure Laterality Date    DELIVERY        Social History     Socioeconomic History    Marital status: UNKNOWN     Spouse name: Not on file    Number of children: Not on file    Years of education: Not on file    Highest education level: Not on file   Occupational History    Not on file   Social Needs    Financial resource strain: Not on file    Food insecurity:     Worry: Not on file     Inability: Not on file    Transportation needs:     Medical: Not on file     Non-medical: Not on file   Tobacco Use    Smoking status: Never Smoker    Smokeless tobacco: Never Used   Substance and Sexual Activity    Alcohol use: No     Comment: quit 2016    Drug use: No    Sexual activity: Yes     Partners: Male   Lifestyle    Physical activity:     Days per week: Not on file     Minutes per session: Not on file    Stress: Not on file   Relationships    Social connections:     Talks on phone: Not on file     Gets together: Not on file     Attends [employer] service: Not on file     Active member of club or organization: Not on file     Attends meetings of clubs or organizations: Not on file     Relationship status: Not on file    Intimate partner violence:     Fear of current or ex partner: Not on file     Emotionally abused: Not on file     Physically abused: Not on file     Forced sexual activity: Not on file   Other Topics Concern    Not on file   Social History Narrative    Not on file     Family History   Problem Relation Age of Onset    Diabetes Maternal Grandmother     Diabetes Maternal Grandfather     Stroke Maternal Grandfather     Hypertension Maternal Grandfather     Diabetes Paternal Grandmother     Diabetes Paternal Grandfather      Current Outpatient Medications   Medication Sig Dispense Refill    acetaminophen (TYLENOL) 325 mg tablet       TYLENOL EXTRA STRENGTH 500 mg tablet       ARIPiprazole (ABILIFY) 5 mg tablet       chlorzoxazone (PARAFON FORTE) 500 mg tablet       clonazePAM (KLONOPIN) 0.5 mg tablet       DEPAKOTE 500 mg tablet       STOOL SOFTENER, DOCUSATE RADHA, 240 mg capsule       doxycycline (VIBRAMYCIN) 100 mg capsule       ibuprofen (MOTRIN) 800 mg tablet       JOLESSA 0.15 mg-30 mcg 3MPk       lidocaine (LIDODERM) 5 %       lithium carbonate 300 mg capsule       metroNIDAZOLE (FLAGYL) 500 mg tablet       naproxen (NAPROSYN) 500 mg tablet       ondansetron (ZOFRAN ODT) 4 mg disintegrating tablet       oxyCODONE IR (ROXICODONE) 5 mg immediate release tablet       pantoprazole (PROTONIX) 40 mg tablet       prazosin (MINIPRESS) 1 mg capsule       promethazine (PHENERGAN) 25 mg tablet       TRANSDERM-SCOP 1 mg over 3 days pt3d       traMADol (ULTRAM) 50 mg tablet       traMADol (ULTRAM-ER) 100 mg Tb24          Review of Systems  Constitutional: The patient has no acute distress or discomfort. HEENT: The patient denies recent head trauma, eye pain, blurred vision,  hearing deficit, oropharyngeal mucosal pain or lesions, and the patient denies throat pain or discomfort. Lymphatics: The patient denies palpable peripheral lymphadenopathy. Hematologic: The patient denies having bruising, bleeding, or progressive fatigue. Respiratory: Patient denies having shortness of breath, cough, sputum production, fever, or dyspnea on exertion. Cardiovascular: The patient denies having leg pain, leg swelling, heart palpitations, chest permit, chest pain, or lightheadedness. The patient denies having dyspnea on exertion. Gastrointestinal: The patient denies having nausea, emesis, or diarrhea. The patient denies having any hematemesis or blood in the stool. Genitourinary: Patient denies having urinary urgency, frequency, or dysuria. The patient denies having blood in the urine. Psychological: The patient denies having symptoms of nervousness, anxiety, depression, or thoughts of harming self. Skin: Patient denies having skin rashes, skin, ulcerations, or unexplained itching or pruritus. Musculoskeletal: The patient denies having pain in the joints or bones. Objective:     Visit Vitals  BP 97/53   Pulse 76   Temp 97.7 °F (36.5 °C) (Oral)   Resp 16   Ht 5' 5\" (1.651 m)   Wt 81.6 kg (180 lb)   SpO2 100%   BMI 29.95 kg/m²     ECOG PS=0    Physical Exam:   Gen. Appearance: The patient is in no acute distress. Skin: There is no bruise or rash. HEENT: The exam is unremarkable. Neck: Supple without lymphadenopathy or thyromegaly. Lungs: Clear to auscultation and percussion; there are no wheezes or rhonchi. Heart: Regular rate and rhythm; there are no murmurs, gallops, or rubs. Abdomen: Bowel sounds are present and normal.  There is no guarding, tenderness, or hepatosplenomegaly. Extremities: There is no clubbing, cyanosis, or edema. Neurologic: There are no focal neurologic deficits. Lymphatics: There is no palpable peripheral lymphadenopathy. Musculoskeletal: The patient has full range of motion at all joints. There is no evidence of joint deformity or effusions. There is no focal joint tenderness. Psychological/psychiatric: There is no clinical evidence of anxiety, depression, or melancholy. Lab data:      Results for orders placed or performed during the hospital encounter of 05/31/19   CBC WITH 3 PART DIFF     Status: None   Result Value Ref Range Status    WBC 7.2 4.5 - 13.0 K/uL Final    RBC 4.58 4.10 - 5.10 M/uL Final    HGB 13.7 12.0 - 16.0 g/dL Final    HCT 40.6 36 - 48 % Final    MCV 88.6 78 - 102 FL Final    MCH 29.9 25.0 - 35.0 PG Final    MCHC 33.7 31 - 37 g/dL Final    RDW 12.2 11.5 - 14.5 % Final    PLATELET 165 695 - 666 K/uL Final    NEUTROPHILS 53 40 - 70 % Final    MIXED CELLS 8 0.1 - 17 % Final    LYMPHOCYTES 39 14 - 44 % Final    ABS. NEUTROPHILS 3.8 1.8 - 9.5 K/UL Final    ABS. MIXED CELLS 0.6 0.0 - 2.3 K/uL Final    ABS. LYMPHOCYTES 2.8 1.1 - 5.9 K/UL Final     Comment: Test performed at [address] [place] [address] or [facility]patient [facility]fusion [facility] Location. Reviewed by Medical Director. DF AUTOMATED   Final           Assessment:     1. Chronic blood loss anemia    2. Dysfunctional uterine bleeding      Plan:   Chronic blood loss anemia: Her CBC today shows her WBC count is normal at  7.2K/uL, hemoglobin is also normal at 13.7g/dLwith hematocrit of 40.6%. Her platelet count is also normal at 197,000. I will recheck her iron profile and ferritin levels at this time. Dysfunctional uterine bleeding: The patient underwent hysterectomy on 1/4/2019 due to her chronic ongoing blood loss. She reports she totally recovered from the surgery. Follow-up in 3 months or sooner if indicated.       Orders Placed This Encounter    COMPLETE CBC & AUTO DIFF WBC    InHouse CBC ([facility])     Standing Status:   Future     Number of Occurrences:   1     Standing Expiration Date:   6/7/2019    IRON PROFILE     Standing Status:   Future     Standing Expiration Date:   5/31/2020    FERRITIN     Standing Status:   Future     Standing Expiration Date:   7/71/4151    METABOLIC PANEL, COMPREHENSIVE     Standing Status:   Future     Standing Expiration Date:   5/31/2020         Jaylyn Ramirez NP  5/31/2019     I have assessed the patient independently and  agree with the full assessment as outlined.   Sol Padilla MD, [place]

## 2019-05-31 NOTE — PATIENT INSTRUCTIONS
Anemia From Heavy Bleeding: Care Instructions  Your Care Instructions    Anemia means that your body does not have enough red blood cells. Red blood cells carry oxygen around the body. When you have anemia, you may feel dizzy, tired, and weak. You may also feel your heart pounding. For some people, it's hard to focus and think clearly. One common cause of anemia is bleeding. Bleeding from ulcers, hemorrhoids, cancer, or other problems can cause anemia. It may also be caused by heavy menstrual periods. Your treatment may include iron pills. Iron helps your body make hemoglobin. Hemoglobin is the part of the red blood cell that carries oxygen. If you have severe anemia, you may need a blood transfusion to give you red blood cells as quickly as possible. Sometimes it takes several months to get iron levels back to normal.  Follow-up care is a key part of your treatment and safety. Be sure to make and go to all appointments, and call your doctor if you are having problems. It's also a good idea to know your test results and keep a list of the medicines you take. How can you care for yourself at home? · Be safe with medicines. Take your medicines exactly as prescribed. Call your doctor if you think you are having a problem with your medicine. · Follow your doctor's advice about eating foods that have a lot of iron in them. These include red meat, shellfish, poultry, and eggs. They also include beans, raisins, whole-grain bread, and leafy green vegetables. · Steam your vegetables. This is the best way to prepare them if you want to get as much iron as possible. · Iron pills can cause constipation. If you take them, there are things you can do to avoid constipation. Drink plenty of fluids, eat foods with a lot of fiber, and exercise every day. When should you call for help? Call 911 anytime you think you may need emergency care.  For example, call if:    · You passed out (lost consciousness).     · Your stools are maroon or very bloody.    Call your doctor now or seek immediate medical care if:    · You are short of breath.     · You have new or worse bleeding.     · You are dizzy or light-headed, or you feel like you may faint.    Watch closely for changes in your health, and be sure to contact your doctor if:    · You feel weaker or more tired than usual.     · You do not get better as expected. Where can you learn more? Go to http://shayy-keira.info/. Enter C642 in the search box to learn more about \"Anemia From Heavy Bleeding: Care Instructions. \"  Current as of: May 6, 2018  Content Version: 11.9  © 9283-4900 [employer], [employer]. Care instructions adapted under license by [employer] (which disclaims liability or warranty for this information). If you have questions about a medical condition or this instruction, always ask your healthcare professional. [address] any warranty or liability for your use of this information.

## 2019-06-05 ENCOUNTER — HOSPITAL ENCOUNTER (OUTPATIENT)
Dept: PHYSICAL THERAPY | Age: 24
Discharge: HOME | End: 2019-06-05
Payer: OTHER GOVERNMENT

## 2019-06-05 PROCEDURE — 97530 THERAPEUTIC ACTIVITIES: CPT

## 2019-06-05 PROCEDURE — 97110 THERAPEUTIC EXERCISES: CPT

## 2019-06-05 NOTE — PROGRESS NOTES
PF DAILY TREATMENT NOTE 3-16    Patient Name: Arnie Valerio  Date:2019  : 1995  [x]  Patient  Verified  Payor: MARLA / Plan: Clayton Marie 73 / Product Type: Kiki Yoder /    In time: 12:37  Out time: 1:15  Total Treatment Time (min): 38  Visit #: 2 of     Treatment Area: [x] Pelvic Floor     [] Other:    SUBJECTIVE  Pain Level (0-10 scale): 0/10  Any medication changes, allergies to medications, adverse drug reactions, diagnosis change, or new procedure performed?: [x] No    [] Yes (see summary sheet for update)  Subjective functional status/changes:   [] No changes reported   pt reports no bowel or bladder problem; mod pain with deep penetration during last sexual relations    OBJECTIVE    23 min Therapeutic Exercise:  [x] See flow sheet :   []  Pelvic floor strengthening                 []  Pelvic floor downtraining  []  Quality pelvic floor contractions       [x]  Relaxation techniques  []  Urge suppression exercises  []  Other:  Rationale: increase ROM, increase strength, improve coordination and increase proprioception  to improve the patients ability to improve tolerance for sexual relations and improve leakage       15 min Therapeutic Activity:  [x]  See flow sheet :    []  Increase Tissue extensibility        []  Assess fiber intake    [x]  Assess voiding habits  [x]  Assess bowel habits  [x]  Other: educated pt on pelviwand use, posture   Rationale: increase ROM, increase strength, improve coordination and increase proprioception  to improve the patients ability to improve tolerance for ADLs/sexual relations          With   [] TE   [] TA   [] neuro  [] manual   [] other: Patient Education: [x] Review HEP    [] Progressed/Changed HEP based on:   [] positioning   [] body mechanics   [] transfers   [] heat/ice application    [] other:      Other Objective/Functional Measures:   []baseline resting tone:   []slow twitch mms   []fast twitch mms    Pain Level (0-10 scale) post treatment: 0/10    ASSESSMENT/Changes in Function: See progress note/recertification      []  Decrease # of leaks   [] No change []  Improving [] Resolved     []  Decrease hypertonus [] No change []  Improving [] Resolved     []  Increase void interval [] No change []  Improving [] Resolved     []  Increase PF strength [] No change []  Improving [] Resolved     []  Increase PF endurance [] No change []  Improving [] Resolved     []  Increase endurance [] No change []  Improving [] Resolved     []  Decrease # of pads [] No change []  Improving [] Resolved     []  Decrease pain [] No change []  Improving [] Resolved     []  Increased coordination [] No change []  Improving [] Resolved     []  Increased Bowel Frequency [] No change []  Improving [] Resolved       Patient will continue to benefit from skilled PT services to modify and progress therapeutic interventions, address functional mobility deficits, address ROM deficits, address strength deficits, analyze and address soft tissue restrictions, analyze and cue movement patterns, analyze and modify body mechanics/ergonomics, assess and modify postural abnormalities and instruct in home and community integration to attain remaining goals. []  See Plan of Care  [x]  See progress note/recertification  []  See Discharge Summary         Progress towards goals / Updated goals:     Short Term Goals: To be accomplished in 4  weeks:  1. Patient will demonstrate home exercise program accurately as adjunct to PT clinic visits to promote healthy lifestyle and increase quality of life. Status @ Eval: initiated with relaxation  Current: not met due to gap with PT 6-5-19     2. Patient will report ability to utilize Pelviwand and Dilator progressively with no pain to promote decrease of symptoms and increase quality of life. Status @ Eval: provide as indicated    Current: not met due to gap with PT 6-5-19     3.  Patient will report pain decreased to 2/3 on Marinoff scale for more normal function and increased quality of life. Status @ Eval: 3/3  Current: not met due to gap with PT 6-5-19      Long Term Goals: To be accomplished in 8  weeks:  1. Patient will have FOTO score for PFDI Pain decreased by 10-20% points indicating improvement in function and report of no difficulty with maintaining intimate relations due to pain. Status @ Eval: 63  Current: not met due to gap with PT 6-5-19     2. Patient will be able to participate in sexual relations with no discomfort with 0-1/3 Marinoff score for increased quality of life and more normal function. Status @ Eval: 3/3   Current: not met due to gap with PT 6-5-19     3. Patient will report at least 50% improvement with urinary leakage to improve her QOL  Status @ Eval: mild leakage occurs 3-4 days a week  Current: not met due to gap with PT 6-5-19     4.  Patient will be independent with HEP at time of discharge to be able to continue with pelvic floor program.  Status @ Eval: initiated with relaxation  Current: not met due to gap with PT 6-5-19    PLAN  [x]  Upgrade activities as tolerated     [x]  Continue plan of care  []  Update interventions per flow sheet       []  Discharge due to:_  []  Other:_      Marylu Milton PT 6/5/2019  12:37 PM    Future Appointments   Date Time Provider Huyen Morris   9/3/2019  1:00 PM Luis Dick MD [id number] [facility]

## 2019-06-05 NOTE — PROGRESS NOTES
[facility] - Myrna Cooley  [address] [facility]  [phone] fax    Pelvic Floor Progress Note  Patient name: [facility] Start of Care: 2019   Referral source: Jenni Tristan MD : 1995               Medical Diagnosis: Chronic female pelvic pain [R10.2, G89.29]  History of hysterectomy [Z90.710]  Payor:  / Plan: Gemma [place] / Product Type: [employer] /  Onset Date: 2019               Treatment Diagnosis: PFD, stress UI   Prior Hospitalization: see medical history Provider#: [id number]   Medications: Verified on Patient summary List    Comorbidities: , hysterectomy, depression, PTSD, anxiety, BPD, asthma   Prior Level of Function: fluctuating with pain, less leakage, mod Ind with all mobility    Visits from Start of Care: 2    Missed Visits: 2    Established Goals:           Excellent Good         Limited           None  [] Increase Pelvic MM strength []  []  []  []  [x] Decrease Pelvic MM hypertonus []  []  []  [x]  [] Decrease Incontinence Episodes []  []  []  []   [x] Improve Voiding Habits  []  []  [x]  []  [] Decreased Urgency   []  []  []  []    Key Functional Changes: no   Updated Goals: to be achieved in 8 weeks:  Short Term Goals: To be accomplished in 4  weeks:  1. Patient will demonstrate home exercise program accurately as adjunct to PT clinic visits to promote healthy lifestyle and increase quality of life. Status @ Eval: initiated with relaxation  Current: not met due to gap with PT 19     2. Patient will report ability to utilize Pelviwand and Dilator progressively with no pain to promote decrease of symptoms and increase quality of life. Status @ Eval: provide as indicated    Current: not met due to gap with PT 19     3. Patient will report pain decreased to 2/3 on Marinoff scale for more normal function and increased quality of life.   Status @ Eval: 3/3  Current: not met due to gap with PT 6-5-19      Long Term Goals: To be accomplished in 8  weeks:  1. Patient will have FOTO score for PFDI Pain decreased by 10-20% points indicating improvement in function and report of no difficulty with maintaining intimate relations due to pain. Status @ Eval: 63  Current: not met due to gap with PT 6-5-19     2. Patient will be able to participate in sexual relations with no discomfort with 0-1/3 Marinoff score for increased quality of life and more normal function. Status @ Eval: 3/3   Current: not met due to gap with PT 6-5-19     3. Patient will report at least 50% improvement with urinary leakage to improve her QOL  Status @ Eval: mild leakage occurs 3-4 days a week  Current: not met due to gap with PT 6-5-19     4. Patient will be independent with HEP at time of discharge to be able to continue with pelvic floor program.  Status @ Eval: initiated with relaxation  Current: not met due to gap with PT 6-5-19      ASSESSMENT/RECOMMENDATIONS: Pt initiated treatment today after 1 month gap with PT due to other jt problem; pt was hospitalized due to worsening depression. Pt reports improving pain with initial penetration but cont to experience mod-max pain during ondina penetration with sexual relations. Pt cont to present with poor posture and flexibility of PF muscles. She would cont to benefit from skilled PT to address these limitations for comfortable ADLs/sexual relation.      [x]Continue therapy per initial plan/protocol at a frequency of  1-2 x per week for 8 weeks  []Continue therapy with the following recommended changes:_____________________      _____________________________________________________________________  []Discontinue therapy progressing towards or have reached established goals  []Discontinue therapy due to lack of appreciable progress towards goals  []Discontinue therapy due to lack of attendance or compliance  []Await Physician's recommendations/decisions regarding therapy  []Other:________________________________________________________________    Thank you for this referral.   Chantal Martinez, PT 6/5/2019 12:48 PM  NOTE TO PHYSICIAN:  PLEASE COMPLETE THE ORDERS BELOW AND   [facility]: [phone]  If you are unable to process this request in 24 hours please contact our office: [phone]    ? I have read the above report and request that my patient continue as recommended. ? I have read the above report and request that my patient continue therapy with the following changes/special instructions:___________________________________________________________  ? I have read the above report and request that my patient be discharged from therapy.     [de-identified] Signature:____________Date:_________TIME:________    Cristin Rook, Date and Time must be completed for valid certification **

## 2019-06-12 ENCOUNTER — APPOINTMENT (OUTPATIENT)
Dept: PHYSICAL THERAPY | Age: 24
End: 2019-06-12
Payer: OTHER GOVERNMENT

## 2019-06-19 ENCOUNTER — APPOINTMENT (OUTPATIENT)
Dept: PHYSICAL THERAPY | Age: 24
End: 2019-06-19
Payer: OTHER GOVERNMENT

## 2019-06-24 ENCOUNTER — APPOINTMENT (OUTPATIENT)
Dept: PHYSICAL THERAPY | Age: 24
End: 2019-06-24
Payer: OTHER GOVERNMENT

## 2019-08-01 NOTE — PROGRESS NOTES
[facility] - [facility]  [address] [place] [address]  [phone] fax    Physical Therapy Discharge Summary    Patient name: Leonides Chowdhury of Care: 2019   Referral Sita Oliveira MD WLQ:                Medical Diagnosis: Chronic female pelvic pain [R10.2, G89.29]  History of hysterectomy [Z90.710]  Payor: MARLA / Plan: Clayton Marie 74 / Product Type: Makayla Dale /  Onset Date: 2019               Treatment Diagnosis: PFD, stress UI   Prior Hospitalization: see medical history Provider#: [id number]   Medications: Verified on Patient summary List    Comorbidities: , hysterectomy, depression, PTSD, anxiety, BPD, asthma   Prior Level of Function: fluctuating with pain, less leakage, mod Ind with all mobility     Visits from Start of Care: 2                                      Missed Visits: 2    Reporting Period : 2019 to 2019    Summary of Care:  Short Term Goals: To be accomplished in 4  weeks:  1. Patient will demonstrate home exercise program accurately as adjunct to PT clinic visits to promote healthy lifestyle and increase quality of life. Status @ Eval: initiated with relaxation  Current: not met due to gap with PT 19  Status at discharge: not met     2. Patient will report ability to utilize Pelviwand and Dilator progressively with no pain to promote decrease of symptoms and increase quality of life. Status @ Eval: provide as indicated    Current: not met due to gap with PT 19  Status at discharge: not met    3. Patient will report pain decreased to 2/3 on Marinoff scale for more normal function and increased quality of life. Status @ Eval: 3/3  Current: not met due to gap with PT 19   Status at discharge: not met     Long Term Goals: To be accomplished in 8  weeks:  1.  Patient will have FOTO score for PFDI Pain decreased by 10-20% points indicating improvement in function and report of no difficulty with maintaining intimate relations due to pain. Status @ Eval: 63  Current: not met due to gap with PT 6-5-19  Status at discharge: not met     2. Patient will be able to participate in sexual relations with no discomfort with 0-1/3 Marinoff score for increased quality of life and more normal function. Status @ Eval: 3/3   Current: not met due to gap with PT 6-5-19  Status at discharge: not met     3. Patient will report at least 50% improvement with urinary leakage to improve her QOL  Status @ Eval: mild leakage occurs 3-4 days a week  Current: not met due to gap with PT 6-5-19  Status at discharge: not met     4. Patient will be independent with HEP at time of discharge to be able to continue with pelvic floor program.  Status @ Eval: initiated with relaxation  Current: not met due to gap with PT 6-5-19  Status at discharge: not met      ASSESSMENT/RECOMMENDATIONS: pt was non-compliant with cont PT as planned.    [x]Discontinue therapy: []Patient has reached or is progressing toward set goals      [x]Patient is non-compliant or has abdicated      []Due to lack of appreciable progress towards set goals    Antonieta Mejia, PT 8/1/2019 2:31 PM

## 2019-10-10 ENCOUNTER — DOCUMENTATION ONLY (OUTPATIENT)
Dept: ONCOLOGY | Age: 24
End: 2019-10-10

## 2019-10-10 NOTE — PROGRESS NOTES
LMOM for patient. Her  Auth/Referral  2019. She has r/s her appt 4x stating each time she'll get a referral.      Left ms for pt we will re-schedule her appointment after she obtains the referral.     Copied this from omer: 10/10/19: Dakota 2019.  NEEDS R/S.     2019: CALLED PT & LMOM WE DO NOT HAVE A  AUTH/REFR FOR HER 19 APPOINTMENT. KMS.        2019: CALLED PT REQUESTED SHE GET  Sam [phone] FAXED TO US FOR HER 9/3/19 APPOINTMENT. Harper Desai

## 2020-10-10 ENCOUNTER — HOSPITAL ENCOUNTER (EMERGENCY)
Age: 25
Discharge: HOME | End: 2020-10-10
Attending: STUDENT IN AN ORGANIZED HEALTH CARE EDUCATION/TRAINING PROGRAM
Payer: COMMERCIAL

## 2020-10-10 VITALS
OXYGEN SATURATION: 97 % | DIASTOLIC BLOOD PRESSURE: 77 MMHG | WEIGHT: 171 LBS | TEMPERATURE: 98.4 F | HEART RATE: 88 BPM | HEIGHT: 65 IN | BODY MASS INDEX: 28.49 KG/M2 | SYSTOLIC BLOOD PRESSURE: 111 MMHG | RESPIRATION RATE: 16 BRPM

## 2020-10-10 DIAGNOSIS — B34.9 VIRAL ILLNESS: Primary | ICD-10-CM

## 2020-10-10 LAB
APPEARANCE UR: ABNORMAL
BILIRUB UR QL: NEGATIVE
COLOR UR: ABNORMAL
DEPRECATED S PYO AG THROAT QL EIA: NEGATIVE
FLUAV AG NPH QL IA: NEGATIVE
FLUBV AG NOSE QL IA: NEGATIVE
GLUCOSE UR STRIP.AUTO-MCNC: NEGATIVE MG/DL
HCG UR QL: NEGATIVE
HGB UR QL STRIP: NEGATIVE
KETONES UR QL STRIP.AUTO: NEGATIVE MG/DL
LEUKOCYTE ESTERASE UR QL STRIP.AUTO: NEGATIVE
NITRITE UR QL STRIP.AUTO: NEGATIVE
PH UR STRIP: 5.5 [PH] (ref 5–8)
PROT UR STRIP-MCNC: NEGATIVE MG/DL
SP GR UR REFRACTOMETRY: >1.03 (ref 1–1.03)
UROBILINOGEN UR QL STRIP.AUTO: 0.2 EU/DL (ref 0.2–1)

## 2020-10-10 PROCEDURE — 81003 URINALYSIS AUTO W/O SCOPE: CPT

## 2020-10-10 PROCEDURE — 81025 URINE PREGNANCY TEST: CPT

## 2020-10-10 PROCEDURE — 74011250637 HC RX REV CODE- 250/637: Performed by: PHYSICIAN ASSISTANT

## 2020-10-10 PROCEDURE — 99283 EMERGENCY DEPT VISIT LOW MDM: CPT

## 2020-10-10 PROCEDURE — 87070 CULTURE OTHR SPECIMN AEROBIC: CPT

## 2020-10-10 PROCEDURE — 87804 INFLUENZA ASSAY W/OPTIC: CPT

## 2020-10-10 PROCEDURE — 87880 STREP A ASSAY W/OPTIC: CPT

## 2020-10-10 RX ORDER — ONDANSETRON 4 MG/1
TABLET, ORALLY DISINTEGRATING ORAL
Qty: 10 TAB | Refills: 0 | Status: SHIPPED | OUTPATIENT
Start: 2020-10-10

## 2020-10-10 RX ORDER — ONDANSETRON 4 MG/1
4 TABLET, ORALLY DISINTEGRATING ORAL ONCE
Status: COMPLETED | OUTPATIENT
Start: 2020-10-10 | End: 2020-10-10

## 2020-10-10 RX ADMIN — ONDANSETRON 4 MG: 4 TABLET, ORALLY DISINTEGRATING ORAL at 14:39

## 2020-10-10 NOTE — DISCHARGE INSTRUCTIONS
Drink plenty of fluids. Take Zofran or Phenergan for nausea as needed as directed. Take Tylenol/Acetaminophen (every 4-6 hours) and/or Motrin/Ibuprofen/Advil (every 6-8 hours) or Naprosyn/Naproxyn/Aleve for fever or pain as needed. Follow up with your primary care provider or the provided referral for further evaluation and management. Since you take lithium, make sure that your primary care provider and/or your psychiatrist check your lithium levels regularly. Return to emergency room at once for worsening or new symptoms.

## 2020-10-10 NOTE — LETTER
NOTIFICATION RETURN TO WORK / SCHOOL 
 
10/10/2020 2:15 PM 
 
Ms. [address] [id number] To Whom It May Concern: [facility] is currently under the care of [facility] EMERGENCY DEPT. She will return to work/school on: 10/12/2020 If there are questions or concerns please have the patient contact our office.  
 
 
 
Sincerely, 
 
 
ALFONSO Atkins

## 2020-10-10 NOTE — ED PROVIDER NOTES
EMERGENCY DEPARTMENT HISTORY AND PHYSICAL EXAM    Date: 10/10/2020  Patient Name: Luis Camara    History of Presenting Illness     Chief Complaint   Patient presents with    Generalized Body Aches    Vomiting    Cough         History Provided By: Patient    Chief Complaint: nausea, body aches  Duration: 1 day   Timing:  Intermittent  Location: NA  Quality: Aching  Severity: Mild  Modifying Factors: none  Associated Symptoms: denies any other associated signs or symptoms      Additional History (Context): Luis Camara is a 31 y.o. female with hx of asthma, anxiety/depression/personality disorder, insomnia, presents ambulatory presents ambulatory c/o body aches, nausea with occasional vomiting since yesterday. Notes mild dry cough. Denies known sick exposures, wears mask at work and outside of the house. Denies change in medications. Denies sore throat, myalgias, ear pain, chest pain, SOB, wheezing, dizziness. Patient is missing work at [employer] due to her symptoms. Patient does not have periods since she had hysterectomy (hx of DUB)    PCP: Deepthi Underwood MD    Current Outpatient Medications   Medication Sig Dispense Refill    OTHER       OTHER       ondansetron (Zofran ODT) 4 mg disintegrating tablet Take 1-2 tablets every 6-8 hours as needed for nausea and vomiting.  10 Tab 0    lithium carbonate 300 mg capsule       prazosin (MINIPRESS) 1 mg capsule       acetaminophen (TYLENOL) 325 mg tablet       TYLENOL EXTRA STRENGTH 500 mg tablet       chlorzoxazone (PARAFON FORTE) 500 mg tablet       DEPAKOTE 500 mg tablet       STOOL SOFTENER, DOCUSATE RADHA, 240 mg capsule       ibuprofen (MOTRIN) 800 mg tablet       lidocaine (LIDODERM) 5 %       TRANSDERM-SCOP 1 mg over 3 days pt3d          Past History     Past Medical History:  Past Medical History:   Diagnosis Date    Anxiety     Appetite loss     Asthma     Back pain     Borderline personality disorder (HCC)     Depression     Falling hair     Muscle pain     PTSD (post-traumatic stress disorder)     Trouble in sleeping        Past Surgical History:  Past Surgical History:   Procedure Laterality Date    DELIVERY          Family History:  Family History   Problem Relation Age of Onset    Diabetes Maternal Grandmother     Diabetes Maternal Grandfather     Stroke Maternal Grandfather     Hypertension Maternal Grandfather     Diabetes Paternal Grandmother     Diabetes Paternal Grandfather        Social History:  Social History     Tobacco Use    Smoking status: Never Smoker    Smokeless tobacco: Never Used   Substance Use Topics    Alcohol use: No     Comment: quit 2016    Drug use: No       Allergies:  No Known Allergies      Review of Systems   Review of Systems   Constitutional: Negative for activity change, appetite change, chills and fever. Body aches   HENT: Negative for ear pain, postnasal drip, rhinorrhea, sore throat and trouble swallowing. Eyes: Negative. Respiratory: Positive for cough (mild, dry per HPI). Negative for chest tightness, shortness of breath and wheezing. Cardiovascular: Negative for chest pain, palpitations and leg swelling. Gastrointestinal: Negative for abdominal pain, diarrhea, nausea and vomiting. Genitourinary: Negative for difficulty urinating. Musculoskeletal: Positive for myalgias (Body aches). Negative for arthralgias, neck pain and neck stiffness. Skin: Negative for color change and rash. Neurological: Negative for dizziness, syncope, weakness, light-headedness and headaches. Hematological: Negative for adenopathy. Psychiatric/Behavioral: Negative for agitation. The patient is not nervous/anxious. All Other Systems Negative  Physical Exam     Vitals:    10/10/20 1253   BP: 111/77   Pulse: 88   Resp: 16   Temp: 98.4 °F (36.9 °C)   SpO2: 97%   Weight: 77.6 kg (171 lb)   Height: 5' 5\" (1.651 m)     Physical Exam  Vitals signs and nursing note reviewed. Constitutional:       General: She is not in acute distress. Appearance: She is well-developed. She is not toxic-appearing or diaphoretic. HENT:      Head: Normocephalic and atraumatic. Right Ear: Tympanic membrane, ear canal and external ear normal.      Left Ear: Tympanic membrane, ear canal and external ear normal.      Nose: Nose normal.      Mouth/Throat:      Mouth: Mucous membranes are moist.      Pharynx: Uvula midline. Eyes:      Extraocular Movements: Extraocular movements intact. Conjunctiva/sclera: Conjunctivae normal.      Pupils: Pupils are equal, round, and reactive to light. Neck:      Musculoskeletal: Normal range of motion and neck supple. Cardiovascular:      Rate and Rhythm: Normal rate and regular rhythm. Heart sounds: Normal heart sounds. Pulmonary:      Effort: Pulmonary effort is normal.      Breath sounds: Normal breath sounds. Abdominal:      General: Bowel sounds are normal.      Palpations: Abdomen is soft. Tenderness: There is no abdominal tenderness. There is no right CVA tenderness, left CVA tenderness, guarding or rebound. Hernia: No hernia is present. Musculoskeletal: Normal range of motion. Lymphadenopathy:      Cervical: No cervical adenopathy. Skin:     General: Skin is warm and dry. Neurological:      Mental Status: She is alert and oriented to person, place, and time. Deep Tendon Reflexes: Reflexes are normal and symmetric. Psychiatric:         Mood and Affect: Mood normal.         Thought Content:  Thought content normal.         Judgment: Judgment normal.          Diagnostic Study Results     Labs -     Recent Results (from the past 12 hour(s))   HCG URINE, QL    Collection Time: 10/10/20  1:30 PM   Result Value Ref Range    HCG urine, QL Negative NEG     URINALYSIS W/ RFLX MICROSCOPIC    Collection Time: 10/10/20  1:30 PM   Result Value Ref Range    Color DARK YELLOW      Appearance CLOUDY      Specific gravity >1.030 (H) 1.005 - 1.030    pH (UA) 5.5 5.0 - 8.0      Protein Negative NEG mg/dL    Glucose Negative NEG mg/dL    Ketone Negative NEG mg/dL    Bilirubin Negative NEG      Blood Negative NEG      Urobilinogen 0.2 0.2 - 1.0 EU/dL    Nitrites Negative NEG      Leukocyte Esterase Negative NEG         Radiologic Studies -   No orders to display     CT Results  (Last 48 hours)    None        CXR Results  (Last 48 hours)    None            Medical Decision Making   I am the first provider for this patient. I reviewed the vital signs, available nursing notes, past medical history, past surgical history, family history and social history. Vital Signs-Reviewed the patient's vital signs. Pulse Oximetry Analysis - 97% on RA    Records Reviewed: Nursing Notes, Old Medical Records, Previous Radiology Studies and Previous Laboratory Studies   Noted visit to [facility] ER on 10/5/2020, had lithium refilled. Patient notes that this is her regular medication, notes lithium levels monitored by her psychiatrist.  Lithium levels are not found on pt's record but pt states levels are nl, checked 5 days ago (was admitted at [facility] for mental jt). Procedures:  Procedures    Provider Notes (Medical Decision Making):     Well-appearing pt with body aches, nausea and dry cough, no cough appreiated on exam, VSS, soft non-tender abd, pt apperas well-hydrated and non-toxic,  Labs are reassuring, neg preg, UA with slightly elev sp gravity, neg rapid strep, suspect she is developing viral illness. Patient's current symptoms are less likely related to lithium toxicity, per patient lithium levels are checked regularly, were nl 5 days ago. Pt is under care of [facility] who also checks her levels. Consult:    3:27 PM   Discussed care with Vivian Mcdonald DO ED attending. Standard discussion; including history of patients chief complaint, available diagnostic results, and treatment course. PLAN: agrees with plan.    Teetee Sanders TREMAINE SPENCER       MED RECONCILIATION:  No current facility-administered medications for this encounter. Current Outpatient Medications   Medication Sig    OTHER     OTHER     ondansetron (Zofran ODT) 4 mg disintegrating tablet Take 1-2 tablets every 6-8 hours as needed for nausea and vomiting.  lithium carbonate 300 mg capsule     prazosin (MINIPRESS) 1 mg capsule     acetaminophen (TYLENOL) 325 mg tablet     TYLENOL EXTRA STRENGTH 500 mg tablet     chlorzoxazone (PARAFON FORTE) 500 mg tablet     DEPAKOTE 500 mg tablet     STOOL SOFTENER, DOCUSATE RADHA, 240 mg capsule     ibuprofen (MOTRIN) 800 mg tablet     lidocaine (LIDODERM) 5 %     TRANSDERM-SCOP 1 mg over 3 days pt3d        Disposition:  home    DISCHARGE NOTE:   Pt has been reexamined. Feeling better with Zofran. Patient has no new complaints, changes, or physical findings. Care plan outlined and precautions discussed. Results of labs were reviewed with the patient. All medications were reviewed with the patient; will d/c home with script for Zofran. All of pt's questions and concerns were addressed. Patient was instructed and agrees to follow up with her PCP, as well as to return to the ED upon further deterioration. Patient is ready to go home. Follow-up Information     Follow up With Specialties Details Why Contact Info    Irasema Montelongo MD Internal Medicine In 5 days for recheck of current symptoms [address] [facility] [address]  [phone]      [facility] EMERGENCY DEPT Emergency Medicine  As needed, If symptoms worsen [address]  [phone]          Current Discharge Medication List      CONTINUE these medications which have CHANGED    Details   ondansetron (Zofran ODT) 4 mg disintegrating tablet Take 1-2 tablets every 6-8 hours as needed for nausea and vomiting. Qty: 10 Tab, Refills: 0             Diagnosis     Clinical Impression: No diagnosis found.          Dictation disclaimer:  Please note that this dictation was completed with [employer], the computer voice recognition software. Quite often unanticipated grammatical, syntax, homophones, and other interpretive errors are inadvertently transcribed by the computer software. Please disregard these errors. Please excuse any errors that have escaped final proofreading.

## 2020-10-13 LAB
BACTERIA SPEC CULT: NORMAL
SERVICE CMNT-IMP: NORMAL

## 2021-06-29 ENCOUNTER — HOSPITAL ENCOUNTER (EMERGENCY)
Age: 26
Discharge: HOME | End: 2021-06-29
Payer: MEDICAID

## 2021-06-29 VITALS
RESPIRATION RATE: 15 BRPM | SYSTOLIC BLOOD PRESSURE: 109 MMHG | TEMPERATURE: 97.88 F | DIASTOLIC BLOOD PRESSURE: 71 MMHG | OXYGEN SATURATION: 97 % | HEART RATE: 114 BPM

## 2021-06-29 VITALS — DIASTOLIC BLOOD PRESSURE: 80 MMHG | OXYGEN SATURATION: 96 % | SYSTOLIC BLOOD PRESSURE: 130 MMHG | HEART RATE: 75 BPM

## 2021-06-29 VITALS
TEMPERATURE: 97.88 F | OXYGEN SATURATION: 94 % | DIASTOLIC BLOOD PRESSURE: 70 MMHG | SYSTOLIC BLOOD PRESSURE: 109 MMHG | HEART RATE: 74 BPM | RESPIRATION RATE: 16 BRPM

## 2021-06-29 VITALS — BODY MASS INDEX: 29.1 KG/M2

## 2021-06-29 VITALS — RESPIRATION RATE: 18 BRPM

## 2021-06-29 VITALS — RESPIRATION RATE: 16 BRPM

## 2021-06-29 DIAGNOSIS — G89.18: Primary | ICD-10-CM

## 2021-06-29 DIAGNOSIS — R10.9: ICD-10-CM

## 2021-06-29 LAB
ALANINE AMINOTRANSFER ALT/SGPT: 19 U/L (ref 13–56)
ALBUMIN SERPL-MCNC: 3.9 G/DL (ref 3.2–5)
ALKALINE PHOSPHATASE: 82 U/L (ref 45–117)
ANION GAP: 5 (ref 5–15)
AST(SGOT): 14 U/L (ref 15–37)
B-HCG SERPL QL: NEGATIVE NEGATIVE
BUN SERPL-MCNC: 11 MG/DL (ref 7–18)
BUN/CREAT RATIO: 17.8 RATIO (ref 10–20)
CALCIUM SERPL-MCNC: 8.9 MG/DL (ref 8.5–10.1)
CARBON DIOXIDE: 27 MMOL/L (ref 21–32)
CHLORIDE: 105 MMOL/L (ref 98–107)
DEPRECATED RDW RBC: 38.1 FL (ref 35.1–43.9)
ERYTHROCYTE [DISTWIDTH] IN BLOOD: 11.9 % (ref 11.6–14.6)
EST GLOM FILT RATE - AFR AMER: 150 ML/MIN (ref 60–?)
ESTIMATED CREATININE CLEARANCE: 123.73 ML/MIN
GLOBULIN: 4.1 G/DL (ref 2.2–4.2)
GLUCOSE: 87 MG/DL (ref 74–106)
HCG SERPL QL: NEGATIVE NEGATIVE
HCT VFR BLD AUTO: 41.6 % (ref 37–47)
HEMOGLOBIN: 14.3 G/DL (ref 12–15)
HGB BLD-MCNC: 14.3 G/DL (ref 12–15)
IMMATURE GRANULOCYTES COUNT: 0.03 X10^3/UL (ref 0–0)
INTERNAL QC VALIDATED?: (no result)
LIPASE: 71 U/L (ref 73–393)
MCV RBC: 86.1 FL (ref 81–99)
MEAN CORP HGB CONC: 34.4 G/DL (ref 32–36)
MEAN PLATELET VOL.: 10.3 FL (ref 6.2–12)
MUCOUS THREADS URNS QL MICRO: (no result) /HPF
NRBC FLAGGED BY ANALYZER: 0 % (ref 0–5)
PLATELET # BLD: 230 K/MM3 (ref 150–450)
PLATELET COUNT: 230 K/MM3 (ref 150–450)
POTASSIUM: 3.8 MMOL/L (ref 3.5–5.1)
RBC # BLD AUTO: 4.83 M/MM3 (ref 4.2–5.4)
RBC DISTRIBUTION WIDTH CV: 11.9 % (ref 11.6–14.6)
RBC DISTRIBUTION WIDTH SD: 38.1 FL (ref 35.1–43.9)
RBC UR QL: (no result) /HPF (ref 0–5)
SP GR UR: 1.01 (ref 1–1.03)
SQUAMOUS URNS QL MICRO: (no result) /HPF (ref 5–10)
URINE PRESERVATIVE: (no result)
WBC # BLD AUTO: 9.4 K/MM3 (ref 4.4–11)
WHITE BLOOD COUNT: 9.4 K/MM3 (ref 4.4–11)

## 2021-06-29 PROCEDURE — 96376 TX/PRO/DX INJ SAME DRUG ADON: CPT

## 2021-06-29 PROCEDURE — 74177 CT ABD & PELVIS W/CONTRAST: CPT

## 2021-06-29 PROCEDURE — 83605 ASSAY OF LACTIC ACID: CPT

## 2021-06-29 PROCEDURE — 96375 TX/PRO/DX INJ NEW DRUG ADDON: CPT

## 2021-06-29 PROCEDURE — 81001 URINALYSIS AUTO W/SCOPE: CPT

## 2021-06-29 PROCEDURE — 99283 EMERGENCY DEPT VISIT LOW MDM: CPT

## 2021-06-29 PROCEDURE — 83690 ASSAY OF LIPASE: CPT

## 2021-06-29 PROCEDURE — 80053 COMPREHEN METABOLIC PANEL: CPT

## 2021-06-29 PROCEDURE — A4216 STERILE WATER/SALINE, 10 ML: HCPCS

## 2021-06-29 PROCEDURE — 84703 CHORIONIC GONADOTROPIN ASSAY: CPT

## 2021-06-29 PROCEDURE — 85025 COMPLETE CBC W/AUTO DIFF WBC: CPT

## 2021-06-29 PROCEDURE — 96361 HYDRATE IV INFUSION ADD-ON: CPT

## 2021-06-29 PROCEDURE — 96374 THER/PROPH/DIAG INJ IV PUSH: CPT

## 2021-07-21 ENCOUNTER — HOSPITAL ENCOUNTER (OUTPATIENT)
Age: 26
End: 2021-07-21
Payer: MEDICAID

## 2021-07-21 VITALS — BODY MASS INDEX: 28.9 KG/M2

## 2021-07-21 DIAGNOSIS — J45.909: Primary | ICD-10-CM

## 2021-07-21 PROCEDURE — 94070 EVALUATION OF WHEEZING: CPT

## 2021-07-21 PROCEDURE — 95070 INHLJ BRNCL CHALLENGE TSTG: CPT

## 2025-03-04 ENCOUNTER — APPOINTMENT (OUTPATIENT)
Dept: RADIOLOGY | Facility: HOSPITAL | Age: 30
End: 2025-03-04
Payer: COMMERCIAL

## 2025-03-04 ENCOUNTER — HOSPITAL ENCOUNTER (EMERGENCY)
Facility: HOSPITAL | Age: 30
Discharge: HOME | End: 2025-03-04
Payer: COMMERCIAL

## 2025-03-04 VITALS
SYSTOLIC BLOOD PRESSURE: 116 MMHG | WEIGHT: 170 LBS | HEART RATE: 99 BPM | OXYGEN SATURATION: 100 % | HEIGHT: 65 IN | BODY MASS INDEX: 28.32 KG/M2 | RESPIRATION RATE: 22 BRPM | DIASTOLIC BLOOD PRESSURE: 74 MMHG | TEMPERATURE: 98.4 F

## 2025-03-04 DIAGNOSIS — R06.02 SHORTNESS OF BREATH: Primary | ICD-10-CM

## 2025-03-04 DIAGNOSIS — R05.1 ACUTE COUGH: ICD-10-CM

## 2025-03-04 LAB
FLUAV RNA RESP QL NAA+PROBE: NOT DETECTED
FLUBV RNA RESP QL NAA+PROBE: NOT DETECTED
SARS-COV-2 RNA RESP QL NAA+PROBE: NOT DETECTED

## 2025-03-04 PROCEDURE — 87636 SARSCOV2 & INF A&B AMP PRB: CPT | Performed by: STUDENT IN AN ORGANIZED HEALTH CARE EDUCATION/TRAINING PROGRAM

## 2025-03-04 PROCEDURE — 71045 X-RAY EXAM CHEST 1 VIEW: CPT | Performed by: STUDENT IN AN ORGANIZED HEALTH CARE EDUCATION/TRAINING PROGRAM

## 2025-03-04 PROCEDURE — 71045 X-RAY EXAM CHEST 1 VIEW: CPT

## 2025-03-04 PROCEDURE — 2500000004 HC RX 250 GENERAL PHARMACY W/ HCPCS (ALT 636 FOR OP/ED): Performed by: NURSE PRACTITIONER

## 2025-03-04 PROCEDURE — 99284 EMERGENCY DEPT VISIT MOD MDM: CPT

## 2025-03-04 RX ORDER — BROMPHENIRAMINE MALEATE, PSEUDOEPHEDRINE HYDROCHLORIDE, AND DEXTROMETHORPHAN HYDROBROMIDE 2; 30; 10 MG/5ML; MG/5ML; MG/5ML
5 SYRUP ORAL 4 TIMES DAILY PRN
Qty: 120 ML | Refills: 0 | Status: SHIPPED | OUTPATIENT
Start: 2025-03-04 | End: 2025-03-14

## 2025-03-04 RX ORDER — PREDNISONE 20 MG/1
40 TABLET ORAL DAILY
Qty: 10 TABLET | Refills: 0 | Status: SHIPPED | OUTPATIENT
Start: 2025-03-04 | End: 2025-03-09

## 2025-03-04 RX ORDER — PREDNISONE 20 MG/1
40 TABLET ORAL ONCE
Status: COMPLETED | OUTPATIENT
Start: 2025-03-04 | End: 2025-03-04

## 2025-03-04 RX ORDER — HYDROCODONE BITARTRATE AND HOMATROPINE METHYLBROMIDE ORAL SOLUTION 5; 1.5 MG/5ML; MG/5ML
5 LIQUID ORAL EVERY 6 HOURS PRN
Status: DISCONTINUED | OUTPATIENT
Start: 2025-03-04 | End: 2025-03-05 | Stop reason: HOSPADM

## 2025-03-04 RX ADMIN — PREDNISONE 40 MG: 20 TABLET ORAL at 21:58

## 2025-03-04 ASSESSMENT — PAIN - FUNCTIONAL ASSESSMENT: PAIN_FUNCTIONAL_ASSESSMENT: 0-10

## 2025-03-04 ASSESSMENT — PAIN DESCRIPTION - PAIN TYPE: TYPE: ACUTE PAIN

## 2025-03-04 ASSESSMENT — PAIN SCALES - GENERAL: PAINLEVEL_OUTOF10: 8

## 2025-03-04 ASSESSMENT — COLUMBIA-SUICIDE SEVERITY RATING SCALE - C-SSRS
6. HAVE YOU EVER DONE ANYTHING, STARTED TO DO ANYTHING, OR PREPARED TO DO ANYTHING TO END YOUR LIFE?: NO
2. HAVE YOU ACTUALLY HAD ANY THOUGHTS OF KILLING YOURSELF?: NO
1. IN THE PAST MONTH, HAVE YOU WISHED YOU WERE DEAD OR WISHED YOU COULD GO TO SLEEP AND NOT WAKE UP?: NO

## 2025-03-04 ASSESSMENT — PAIN DESCRIPTION - LOCATION: LOCATION: CHEST

## 2025-03-04 ASSESSMENT — PAIN DESCRIPTION - ORIENTATION: ORIENTATION: RIGHT;MID

## 2025-03-04 NOTE — LETTER
March 5, 2025    Patient: Sudha Skinner   YOB: 1995   Date of Visit: 3/4/2025       To Whom It May Concern:    Sudha Skinner was seen and treated in our emergency department on 3/4/2025. She may return to work on 3/6/2025.     If you have any questions or concerns, please don't hesitate to call.              CC: No Recipients

## 2025-03-05 NOTE — ED PROVIDER NOTES
"HPI   Chief Complaint   Patient presents with    Shortness of Breath     Pt c/o shob after a \"coughing fit\" while at work, pt states she used her inhaler but that did not help       HPI  See my MDM      Patient History   Past Medical History:   Diagnosis Date    Asthma     Endometriosis     Migraines      History reviewed. No pertinent surgical history.  No family history on file.  Social History     Tobacco Use    Smoking status: Never    Smokeless tobacco: Never   Vaping Use    Vaping status: Never Used   Substance Use Topics    Alcohol use: Never    Drug use: Never       Physical Exam   ED Triage Vitals [03/04/25 2129]   Temperature Heart Rate Respirations BP   36.9 °C (98.4 °F) 99 (!) 22 116/74      Pulse Ox Temp Source Heart Rate Source Patient Position   100 % Oral Monitor --      BP Location FiO2 (%)     -- --       Physical Exam    CONSTITUTIONAL: Vital signs reviewed as charted, well-developed and in no distress  Eyes: Extraocular muscles are intact. Pupils equal round and reactive to light. Conjunctiva are pink.    ENT: Mucous membranes are moist. Tongue in the midline. Pharynx was without erythema or exudates, uvula midline  LUNGS: Breath sounds equal and clear to auscultation. Good air exchange, no wheezes rales or retractions, pulse oximetry is charted.  HEART: Regular rate and rhythm without murmur thrill or rub, strong tones, auscultation is normal.  ABDOMEN: Soft and nontender without guarding rebound rigidity or mass. Bowel sounds are present and normal in all quadrants. There is no palpable masses or aneurysms identified. No hepatosplenomegaly, normal abdominal exam.  Neuro: The patient is awake, alert and oriented ×3. Moving all 4 extremities and answering questions appropriately.   MUSCULOSKELETAL: The calves are nontender to palpation. Full gross active range of motion.   PSYCH: Awake alert oriented, patient appears anxious  Skin:  Dry, normal color, warm to the touch, no rash present.      ED " Course & MDM   Diagnoses as of 03/04/25 2308   Shortness of breath   Acute cough                 No data recorded     Te Coma Scale Score: 15 (03/04/25 2130 : Amisha Hyatt RN)                           Medical Decision Making  History obtained from: patient    Vital signs, nursing notes, current medications, past medical history, Surgical history, allergies, social history, family History were reviewed.         HPI:  Patient 29-year-old female present emergency room today stating she woke up this morning little bit of a cough runny nose.  States she was at work had a coughing spell and felt like she could not catch her breath.  She used her inhaler and it did not help so she called 911.  Denies dizziness, chest pain, abdominal pain extremity edema.  She does appear anxious but is nontoxic.      10 point ROS was reviewed and negative except Noted above in HPI.  DDX: as listed above          MDM Summary/considerations:  Labs Reviewed   SARS-COV-2 AND INFLUENZA A/B PCR - Normal       Result Value    Flu A Result Not Detected      Flu B Result Not Detected      Coronavirus 2019, PCR Not Detected      Narrative:     This assay is an FDA-cleared, in vitro diagnostic nucleic acid amplification test for the qualitative detection and differentiation of SARS CoV-2/ Influenza A/B from nasopharyngeal specimens collected from individuals with signs and symptoms of respiratory tract infections, and has been validated for use at [facility]. Negative results do not preclude COVID-19/ Influenza A/B infections and should not be used as the sole basis for diagnosis, treatment, or other management decisions. Testing for SARS CoV-2 is recommended only for patients who meet current clinical and/or epidemiological criteria defined by federal, state, or local public health directives.     XR chest 1 view   Final Result   No acute cardiopulmonary process.             MACRO:   None.        Signed by: Cabrera  Ray 3/4/2025 10:21 PM   Dictation workstation:   [id number]        Medications   hydrocodone-homatropine (Hycodan) 5-1.5 mg/5 mL syrup 5 mL (has no administration in time range)   predniSONE (Deltasone) tablet 40 mg (40 mg oral Given 3/4/25 2158)     Discharge Medication List as of 3/4/2025 10:37 PM        START taking these medications    Details   brompheniramine-pseudoeph-DM 2-30-10 mg/5 mL syrup Take 5 mL by mouth 4 times a day as needed for allergies for up to 10 days., Starting Tue 3/4/2025, Until Fri 3/14/2025 at 2359, Normal      predniSONE (Deltasone) 20 mg tablet Take 2 tablets (40 mg) by mouth once daily for 5 days., Starting Tue 3/4/2025, Until Sun 3/9/2025, Normal           I estimate there is LOW risk for EPIGLOTTITIS, PNEUMONIA, MENINGITIS, OR URINARY TRACT INFECTION, thus I consider the discharge disposition reasonable. Also, there is no evidence for peritonitis, sepsis, or toxicity. We have discussed the diagnosis and risks, and we agree with discharging home to follow-up with their primary doctor. We also discussed returning to the Emergency Department immediately if new or worsening symptoms occur. We have discussed the symptoms which are most concerning (e.g., changing or worsening pain, trouble swallowing or breathing, neck stiffness, fever) that necessitate immediate return.    COVID-19 and influenza were negative, normal chest x-ray completed here in the ED.  Patient was feeling better after medications.  Was discharged home stable condition will follow PCP 1 to 2 days for reevaluation.    All of the patient's questions were answered to the best of my ability.  Patient states understanding that they have been screened for an emergency today and we have not found any etiology of symptoms that requires emergent treatment or admission to the hospital at this point. They understand that they have not had definitive care day and require follow-up for treatment of their condition. They also  state understanding that they may have an emergent condition that may potentially have not of detected at this visit and they must return to the emergency department if they develop any worsening of symptoms or new complaints.      I have evaluated this patient, my supervising physician was available for consultation.              Critical Care: Not warranted at this time        This chart was completed using voice recognition transcription software. Please excuse any errors of transcription including grammatical, punctuation, syntax and spelling errors.  Please contact me with any questions regarding this chart.    Procedure  Procedures     KAM Brody-CNP  03/04/25 7238